# Patient Record
Sex: MALE | Race: WHITE | NOT HISPANIC OR LATINO | Employment: OTHER | URBAN - NONMETROPOLITAN AREA
[De-identification: names, ages, dates, MRNs, and addresses within clinical notes are randomized per-mention and may not be internally consistent; named-entity substitution may affect disease eponyms.]

---

## 2018-05-16 ENCOUNTER — TRANSCRIBE ORDERS (OUTPATIENT)
Dept: ADMINISTRATIVE | Facility: HOSPITAL | Age: 73
End: 2018-05-16

## 2018-05-16 DIAGNOSIS — R91.8 LUNG MASS: Primary | ICD-10-CM

## 2018-05-18 ENCOUNTER — HOSPITAL ENCOUNTER (OUTPATIENT)
Dept: PET IMAGING | Facility: HOSPITAL | Age: 73
Discharge: HOME | End: 2018-05-18
Admitting: FAMILY MEDICINE

## 2018-05-18 ENCOUNTER — APPOINTMENT (OUTPATIENT)
Dept: PET IMAGING | Facility: HOSPITAL | Age: 73
End: 2018-05-18

## 2018-05-18 DIAGNOSIS — R91.8 LUNG MASS: ICD-10-CM

## 2018-05-18 PROCEDURE — 0 FLUDEOXYGLUCOSE F18 SOLUTION: Performed by: FAMILY MEDICINE

## 2018-05-18 PROCEDURE — A9552 F18 FDG: HCPCS | Performed by: FAMILY MEDICINE

## 2018-05-18 PROCEDURE — 78815 PET IMAGE W/CT SKULL-THIGH: CPT | Performed by: RADIOLOGY

## 2018-05-18 PROCEDURE — 78815 PET IMAGE W/CT SKULL-THIGH: CPT

## 2018-05-18 RX ADMIN — FLUDEOXYGLUCOSE F18 1 DOSE: 300 INJECTION INTRAVENOUS at 08:10

## 2019-01-17 ENCOUNTER — HOSPITAL ENCOUNTER (EMERGENCY)
Facility: HOSPITAL | Age: 74
Discharge: HOME | End: 2019-01-17
Attending: EMERGENCY MEDICINE | Admitting: EMERGENCY MEDICINE

## 2019-01-17 ENCOUNTER — APPOINTMENT (OUTPATIENT)
Dept: CT IMAGING | Facility: HOSPITAL | Age: 74
End: 2019-01-17

## 2019-01-17 ENCOUNTER — APPOINTMENT (OUTPATIENT)
Dept: GENERAL RADIOLOGY | Facility: HOSPITAL | Age: 74
End: 2019-01-17

## 2019-01-17 ENCOUNTER — APPOINTMENT (OUTPATIENT)
Dept: CARDIOLOGY | Facility: HOSPITAL | Age: 74
End: 2019-01-17
Attending: EMERGENCY MEDICINE

## 2019-01-17 VITALS
HEIGHT: 78 IN | HEART RATE: 89 BPM | RESPIRATION RATE: 16 BRPM | WEIGHT: 240.3 LBS | BODY MASS INDEX: 27.8 KG/M2 | DIASTOLIC BLOOD PRESSURE: 88 MMHG | TEMPERATURE: 98.6 F | OXYGEN SATURATION: 95 % | SYSTOLIC BLOOD PRESSURE: 132 MMHG

## 2019-01-17 DIAGNOSIS — I82.623 ACUTE DEEP VEIN THROMBOSIS (DVT) OF BOTH UPPER EXTREMITIES, UNSPECIFIED VEIN (HCC): Primary | ICD-10-CM

## 2019-01-17 DIAGNOSIS — I48.92 ATRIAL FLUTTER WITH RAPID VENTRICULAR RESPONSE (HCC): ICD-10-CM

## 2019-01-17 DIAGNOSIS — Z79.01 CHRONIC ANTICOAGULATION: ICD-10-CM

## 2019-01-17 LAB
ALBUMIN SERPL-MCNC: 3.85 G/DL (ref 3.2–4.8)
ALBUMIN/GLOB SERPL: 1.4 G/DL (ref 1.5–2.5)
ALP SERPL-CCNC: 90 U/L (ref 25–100)
ALT SERPL W P-5'-P-CCNC: 45 U/L (ref 7–40)
ANION GAP SERPL CALCULATED.3IONS-SCNC: 8 MMOL/L (ref 3–11)
APTT PPP: 39.9 SECONDS (ref 24–37)
AST SERPL-CCNC: 25 U/L (ref 0–33)
BASOPHILS # BLD AUTO: 0.04 10*3/MM3 (ref 0–0.2)
BASOPHILS NFR BLD AUTO: 0.4 % (ref 0–1)
BH CV UPPER VENOUS LEFT AXILLARY COMPRESS: NORMAL
BH CV UPPER VENOUS LEFT AXILLARY PHASIC: NORMAL
BH CV UPPER VENOUS LEFT AXILLARY SPONT: NORMAL
BH CV UPPER VENOUS LEFT BASILIC FOREARM COLOR: 1
BH CV UPPER VENOUS LEFT BASILIC FOREARM COMPRESS: NORMAL
BH CV UPPER VENOUS LEFT BASILIC FOREARM THROMBUS: NORMAL
BH CV UPPER VENOUS LEFT BASILIC UPPER COMPRESS: NORMAL
BH CV UPPER VENOUS LEFT BRACHIAL COMPRESS: NORMAL
BH CV UPPER VENOUS LEFT BRACHIAL PHASIC: NORMAL
BH CV UPPER VENOUS LEFT BRACHIAL SPONT: NORMAL
BH CV UPPER VENOUS LEFT CEPHALIC FOREARM COMPRESS: NORMAL
BH CV UPPER VENOUS LEFT CEPHALIC UPPER COMPRESS: NORMAL
BH CV UPPER VENOUS LEFT INTERNAL JUGULAR COMPRESS: NORMAL
BH CV UPPER VENOUS LEFT INTERNAL JUGULAR PHASIC: NORMAL
BH CV UPPER VENOUS LEFT INTERNAL JUGULAR SPONT: NORMAL
BH CV UPPER VENOUS LEFT RADIAL COMPRESS: NORMAL
BH CV UPPER VENOUS LEFT RADIAL PHASIC: NORMAL
BH CV UPPER VENOUS LEFT RADIAL SPONT: NORMAL
BH CV UPPER VENOUS LEFT SUBCLAVIAN COMPRESS: NORMAL
BH CV UPPER VENOUS LEFT SUBCLAVIAN PHASIC: NORMAL
BH CV UPPER VENOUS LEFT SUBCLAVIAN SPONT: NORMAL
BH CV UPPER VENOUS LEFT ULNAR COMPRESS: NORMAL
BH CV UPPER VENOUS LEFT ULNAR PHASIC: NORMAL
BH CV UPPER VENOUS LEFT ULNAR SPONT: NORMAL
BH CV UPPER VENOUS RIGHT AXILLARY COMPRESS: NORMAL
BH CV UPPER VENOUS RIGHT AXILLARY PHASIC: NORMAL
BH CV UPPER VENOUS RIGHT AXILLARY SPONT: NORMAL
BH CV UPPER VENOUS RIGHT BASILIC FOREARM COLOR: 1
BH CV UPPER VENOUS RIGHT BASILIC FOREARM COMPRESS: NORMAL
BH CV UPPER VENOUS RIGHT BASILIC FOREARM THROMBUS: NORMAL
BH CV UPPER VENOUS RIGHT BASILIC UPPER COLOR: 1
BH CV UPPER VENOUS RIGHT BASILIC UPPER COMPRESS: NORMAL
BH CV UPPER VENOUS RIGHT BASILIC UPPER THROMBUS: NORMAL
BH CV UPPER VENOUS RIGHT BRACHIAL COMPRESS: NORMAL
BH CV UPPER VENOUS RIGHT BRACHIAL PHASIC: NORMAL
BH CV UPPER VENOUS RIGHT BRACHIAL SPONT: NORMAL
BH CV UPPER VENOUS RIGHT CEPHALIC FOREARM COLOR: 1
BH CV UPPER VENOUS RIGHT CEPHALIC FOREARM COMPRESS: NORMAL
BH CV UPPER VENOUS RIGHT CEPHALIC FOREARM THROMBUS: NORMAL
BH CV UPPER VENOUS RIGHT CEPHALIC UPPER COMPRESS: NORMAL
BH CV UPPER VENOUS RIGHT INTERNAL JUGULAR COMPRESS: NORMAL
BH CV UPPER VENOUS RIGHT INTERNAL JUGULAR PHASIC: NORMAL
BH CV UPPER VENOUS RIGHT INTERNAL JUGULAR SPONT: NORMAL
BH CV UPPER VENOUS RIGHT RADIAL COMPRESS: NORMAL
BH CV UPPER VENOUS RIGHT RADIAL PHASIC: NORMAL
BH CV UPPER VENOUS RIGHT RADIAL SPONT: NORMAL
BH CV UPPER VENOUS RIGHT SUBCLAVIAN COMPRESS: NORMAL
BH CV UPPER VENOUS RIGHT SUBCLAVIAN PHASIC: NORMAL
BH CV UPPER VENOUS RIGHT SUBCLAVIAN SPONT: NORMAL
BH CV UPPER VENOUS RIGHT ULNAR COMPRESS: NORMAL
BH CV UPPER VENOUS RIGHT ULNAR PHASIC: NORMAL
BH CV UPPER VENOUS RIGHT ULNAR SPONT: NORMAL
BILIRUB SERPL-MCNC: 0.3 MG/DL (ref 0.3–1.2)
BNP SERPL-MCNC: 247 PG/ML (ref 0–100)
BUN BLD-MCNC: 10 MG/DL (ref 9–23)
BUN/CREAT SERPL: 12.3 (ref 7–25)
CALCIUM SPEC-SCNC: 9 MG/DL (ref 8.7–10.4)
CHLORIDE SERPL-SCNC: 99 MMOL/L (ref 99–109)
CO2 SERPL-SCNC: 29 MMOL/L (ref 20–31)
CREAT BLD-MCNC: 0.81 MG/DL (ref 0.6–1.3)
DEPRECATED RDW RBC AUTO: 50.8 FL (ref 37–54)
EOSINOPHIL # BLD AUTO: 0.23 10*3/MM3 (ref 0–0.3)
EOSINOPHIL NFR BLD AUTO: 2.4 % (ref 0–3)
ERYTHROCYTE [DISTWIDTH] IN BLOOD BY AUTOMATED COUNT: 15.8 % (ref 11.3–14.5)
GFR SERPL CREATININE-BSD FRML MDRD: 94 ML/MIN/1.73
GLOBULIN UR ELPH-MCNC: 2.9 GM/DL
GLUCOSE BLD-MCNC: 231 MG/DL (ref 70–100)
HCT VFR BLD AUTO: 35.9 % (ref 38.9–50.9)
HGB BLD-MCNC: 11.5 G/DL (ref 13.1–17.5)
HOLD SPECIMEN: NORMAL
HOLD SPECIMEN: NORMAL
IMM GRANULOCYTES # BLD AUTO: 0.02 10*3/MM3 (ref 0–0.03)
IMM GRANULOCYTES NFR BLD AUTO: 0.2 % (ref 0–0.6)
INR PPP: 1.26 (ref 0.85–1.16)
LIPASE SERPL-CCNC: 24 U/L (ref 6–51)
LYMPHOCYTES # BLD AUTO: 2.1 10*3/MM3 (ref 0.6–4.8)
LYMPHOCYTES NFR BLD AUTO: 21.8 % (ref 24–44)
MAGNESIUM SERPL-MCNC: 2 MG/DL (ref 1.3–2.7)
MCH RBC QN AUTO: 28.3 PG (ref 27–31)
MCHC RBC AUTO-ENTMCNC: 32 G/DL (ref 32–36)
MCV RBC AUTO: 88.4 FL (ref 80–99)
MONOCYTES # BLD AUTO: 1.09 10*3/MM3 (ref 0–1)
MONOCYTES NFR BLD AUTO: 11.3 % (ref 0–12)
NEUTROPHILS # BLD AUTO: 6.17 10*3/MM3 (ref 1.5–8.3)
NEUTROPHILS NFR BLD AUTO: 63.9 % (ref 41–71)
PLATELET # BLD AUTO: 317 10*3/MM3 (ref 150–450)
PMV BLD AUTO: 9 FL (ref 6–12)
POTASSIUM BLD-SCNC: 4.3 MMOL/L (ref 3.5–5.5)
PROT SERPL-MCNC: 6.7 G/DL (ref 5.7–8.2)
PROTHROMBIN TIME: 15.2 SECONDS (ref 11.2–14.3)
RBC # BLD AUTO: 4.06 10*6/MM3 (ref 4.2–5.76)
SODIUM BLD-SCNC: 136 MMOL/L (ref 132–146)
T4 FREE SERPL-MCNC: 1.36 NG/DL (ref 0.89–1.76)
TROPONIN I SERPL-MCNC: 0 NG/ML (ref 0–0.07)
TROPONIN I SERPL-MCNC: 0 NG/ML (ref 0–0.07)
TSH SERPL DL<=0.05 MIU/L-ACNC: 2.84 MIU/ML (ref 0.35–5.35)
WBC NRBC COR # BLD: 9.65 10*3/MM3 (ref 3.5–10.8)
WHOLE BLOOD HOLD SPECIMEN: NORMAL
WHOLE BLOOD HOLD SPECIMEN: NORMAL

## 2019-01-17 PROCEDURE — 96374 THER/PROPH/DIAG INJ IV PUSH: CPT

## 2019-01-17 PROCEDURE — 71275 CT ANGIOGRAPHY CHEST: CPT

## 2019-01-17 PROCEDURE — 71045 X-RAY EXAM CHEST 1 VIEW: CPT

## 2019-01-17 PROCEDURE — 83690 ASSAY OF LIPASE: CPT | Performed by: EMERGENCY MEDICINE

## 2019-01-17 PROCEDURE — 84443 ASSAY THYROID STIM HORMONE: CPT | Performed by: EMERGENCY MEDICINE

## 2019-01-17 PROCEDURE — 85610 PROTHROMBIN TIME: CPT | Performed by: EMERGENCY MEDICINE

## 2019-01-17 PROCEDURE — 83735 ASSAY OF MAGNESIUM: CPT | Performed by: EMERGENCY MEDICINE

## 2019-01-17 PROCEDURE — 83880 ASSAY OF NATRIURETIC PEPTIDE: CPT | Performed by: EMERGENCY MEDICINE

## 2019-01-17 PROCEDURE — 93970 EXTREMITY STUDY: CPT

## 2019-01-17 PROCEDURE — 93970 EXTREMITY STUDY: CPT | Performed by: INTERNAL MEDICINE

## 2019-01-17 PROCEDURE — 85730 THROMBOPLASTIN TIME PARTIAL: CPT | Performed by: EMERGENCY MEDICINE

## 2019-01-17 PROCEDURE — 80053 COMPREHEN METABOLIC PANEL: CPT | Performed by: EMERGENCY MEDICINE

## 2019-01-17 PROCEDURE — 93005 ELECTROCARDIOGRAM TRACING: CPT

## 2019-01-17 PROCEDURE — 93005 ELECTROCARDIOGRAM TRACING: CPT | Performed by: EMERGENCY MEDICINE

## 2019-01-17 PROCEDURE — 85025 COMPLETE CBC W/AUTO DIFF WBC: CPT | Performed by: EMERGENCY MEDICINE

## 2019-01-17 PROCEDURE — 84439 ASSAY OF FREE THYROXINE: CPT | Performed by: EMERGENCY MEDICINE

## 2019-01-17 PROCEDURE — 84484 ASSAY OF TROPONIN QUANT: CPT

## 2019-01-17 PROCEDURE — 96376 TX/PRO/DX INJ SAME DRUG ADON: CPT

## 2019-01-17 PROCEDURE — 0 IOPAMIDOL PER 1 ML: Performed by: EMERGENCY MEDICINE

## 2019-01-17 PROCEDURE — 99285 EMERGENCY DEPT VISIT HI MDM: CPT

## 2019-01-17 RX ORDER — ASPIRIN 81 MG/1
81 TABLET ORAL DAILY
COMMUNITY

## 2019-01-17 RX ORDER — PENTOXIFYLLINE 400 MG/1
400 TABLET, EXTENDED RELEASE ORAL 2 TIMES DAILY
COMMUNITY

## 2019-01-17 RX ORDER — PANTOPRAZOLE SODIUM 40 MG/1
40 TABLET, DELAYED RELEASE ORAL DAILY
COMMUNITY

## 2019-01-17 RX ORDER — ASPIRIN 81 MG/1
324 TABLET, CHEWABLE ORAL ONCE
Status: DISCONTINUED | OUTPATIENT
Start: 2019-01-17 | End: 2019-01-17

## 2019-01-17 RX ORDER — AMIODARONE HYDROCHLORIDE 200 MG/1
200 TABLET ORAL DAILY
COMMUNITY
End: 2019-01-26 | Stop reason: HOSPADM

## 2019-01-17 RX ORDER — METOPROLOL TARTRATE 5 MG/5ML
5 INJECTION INTRAVENOUS ONCE
Status: COMPLETED | OUTPATIENT
Start: 2019-01-17 | End: 2019-01-17

## 2019-01-17 RX ORDER — SODIUM CHLORIDE 0.9 % (FLUSH) 0.9 %
10 SYRINGE (ML) INJECTION AS NEEDED
Status: DISCONTINUED | OUTPATIENT
Start: 2019-01-17 | End: 2019-01-17 | Stop reason: HOSPADM

## 2019-01-17 RX ORDER — METOPROLOL SUCCINATE 50 MG/1
50 TABLET, EXTENDED RELEASE ORAL 2 TIMES DAILY
COMMUNITY
End: 2019-02-04 | Stop reason: DRUGHIGH

## 2019-01-17 RX ORDER — METOPROLOL TARTRATE 5 MG/5ML
5 INJECTION INTRAVENOUS ONCE
Status: DISCONTINUED | OUTPATIENT
Start: 2019-01-17 | End: 2019-01-17 | Stop reason: HOSPADM

## 2019-01-17 RX ADMIN — METOPROLOL TARTRATE 25 MG: 25 TABLET ORAL at 15:33

## 2019-01-17 RX ADMIN — METOPROLOL TARTRATE 5 MG: 1 INJECTION, SOLUTION INTRAVENOUS at 15:33

## 2019-01-17 RX ADMIN — METOPROLOL TARTRATE 25 MG: 25 TABLET ORAL at 18:01

## 2019-01-17 RX ADMIN — METOPROLOL TARTRATE 5 MG: 1 INJECTION, SOLUTION INTRAVENOUS at 17:58

## 2019-01-17 RX ADMIN — IOPAMIDOL 75 ML: 755 INJECTION, SOLUTION INTRAVENOUS at 17:24

## 2019-01-17 NOTE — DISCHARGE INSTRUCTIONS
Schedule an appointment with your PCP, Dr. Arriaga, within one week for close follow up of your DVT until resolution. Follow up with the atrial fibrillation clinic I have referred you to within one week if you chose to do so. Follow up with Dr. Tracey in coordination with your PCP for follow up and evaluation of your blood clots. Follow up with your Cardiologist tomorrow as scheduled. Increase your Eliquis to 10 mgs ( 2 pills) twice a day for the next 7 days. Afterward, take 5 mgs (1 pill) twice a day for both treatment of the DVT which will be at least 3 months, and for stroke prophylaxis with your atrial flutter.  The total duration will be determined by your primary care physician and cardiologist. Increase your Metoprolol XL to 100 mg nightly.     Immediately return to the ED for worsening or new concerning symptoms.     Please review the medications you are supposed to be taking according to prior physician recommendations. I have not changed your home medications during this visit. If your discharge instructions indicate that I have changed your home medications, this is not the case, and you should disregard. If you have any questions about the medication you should be taking at home, please call your physician.

## 2019-01-17 NOTE — ED PROVIDER NOTES
Subjective   Monico Roman is a 73 y.o.male who presents to the ED with complaints of a rapid heart rate. The patient reports having rapid palpitations for the past 2 weeks. He has been seen at [facility] for his heart rate. He says they changed his medication, put him on Eliquis, and advised to schedule a follow up appointment with his Cardiologist. He denies being cardioverted electrically or discussing plans for it. He has had a cough for 1 week. The patient has a hx of lung cancer and states that he had a partial lobectomy 2-3 months ago which was successful and without complications. He was receiving chemotherapy and radiation prior to his procedure. He quit smoking prior to this procedure. During his most recent hospital visit, he was diagnosed with CHF as well. His Cardiologist is in [place] and has an appointment scheduled for tomorrow, but he and his family express moving his Cardiology care to [facility]. He has no prior hx of palpitations, atrial flutter, or atrial fibrillation. There are no other acute complaints at this time.         History provided by:  Patient  Palpitations   Palpitations quality:  Fast  Onset quality:  Gradual  Duration:  2 weeks  Timing:  Constant  Progression:  Unchanged  Chronicity:  New  Relieved by:  Nothing  Worsened by:  Nothing      Review of Systems   Cardiovascular: Positive for palpitations.   All other systems reviewed and are negative.      Past Medical History:   Diagnosis Date   • Atrial fibrillation (CMS/HCC)    • Borderline diabetes    • Cancer (CMS/HCC)     small cell carcinoma   • CHF (congestive heart failure) (CMS/HCC)    • Coronary artery disease    • Hypertension    • Myocardial infarction (CMS/HCC)    • Stented coronary artery        No Known Allergies    Past Surgical History:   Procedure Laterality Date   • LUNG REMOVAL, PARTIAL         History reviewed. No pertinent family history.    Social History     Socioeconomic History   •  Marital status:      Spouse name: Not on file   • Number of children: Not on file   • Years of education: Not on file   • Highest education level: Not on file   Tobacco Use   • Smoking status: Former Smoker   Substance and Sexual Activity   • Alcohol use: No     Frequency: Never   • Drug use: No   • Sexual activity: Defer         Objective   Physical Exam   Constitutional: He is oriented to person, place, and time. He appears well-developed and well-nourished. No distress.   HENT:   Head: Normocephalic and atraumatic.   Nose: Nose normal.   Eyes: Conjunctivae are normal. No scleral icterus.   Neck: Normal range of motion. Neck supple.   Cardiovascular: Normal heart sounds. An irregularly irregular rhythm present. Tachycardia present.   No murmur heard.  Pulmonary/Chest: Effort normal. No respiratory distress.   He has decreased breath sounds on the left.    Abdominal: Soft. Bowel sounds are normal. There is no tenderness.   Musculoskeletal: Normal range of motion. He exhibits edema.   He has trace ankle edema.   Neurological: He is alert and oriented to person, place, and time.   Skin: Skin is warm and dry. There is erythema.   He has some firm areas of erythema with rope like density proximal to his IV sight near his right antecubital fossa.    Psychiatric: He has a normal mood and affect. His behavior is normal.   Nursing note and vitals reviewed.      Procedures         ED Course  ED Course as of Jan 17 1822   Thu Jan 17, 2019 1735 Reevaluated the patient at bedside and updated him on findings and plan for further care. His wife who is now at  bedside said that Sanders did shock him at his most recent visit.   [TJ]   1759 Revisited the patient at bedside and updated him on findings and plan for further care.   [TJ]   1811 Patient had some recurrent tachycardia treated with 3 total doses of IV metoprolol along with 50 mg of the medial release oral metoprolol.  Tachycardia is now again improved.  He is  on 50 mg of Toprol-XL at night.  I discussed increasing this to 100 mg tonight.  He continues on amiodarone and has a pre-existing appointment with his cardiologist in [place] tomorrow.  His daughter requested follow-up here at [facility] with cardiology, however in discussion with the patient and his wife he is reticent to drive 3 hours round trip for cardiology care here and would prefer to follow up tomorrow with his cardiologist in [place].  They however would like referral to the A. fib clinic in coordination with this in case they need specialty care for his atrial flutter here in [place] but again have declined A. fib clinic follow-up tomorrow as they are to have a prescheduled appointment with cardiology.I discussed his bilateral upper extremity DVTs but no PE.  He is already on Eliquis.  I'll increase his dose consistent with acute DVT treatment.I discussed follow-up with his PCP concerning his upper extremity DVTs, but will refer him to Dr. Tracey as well for outpatient hematology management in coordination with his physicianWe've discussed indications for immediate returnVery pleasant reliable patient and spouse verbalized understanding agreement with plan of care, need for follow-up, and indications for immediate return.  [HH]   1819 He increased his dose of Eliquis to 20 mg twice a day for 7 days consistent with acute treatment of DVT, with continuation of 5 mg twice a day ID for treatment of DVT as well as stroke prophylaxis.  [HH]      ED Course User Index  [HH] Jeremías Pack MD  [TJ] Jc Reyes       Recent Results (from the past 24 hour(s))   Comprehensive Metabolic Panel    Collection Time: 01/17/19  2:32 PM   Result Value Ref Range    Glucose 231 (H) 70 - 100 mg/dL    BUN 10 9 - 23 mg/dL    Creatinine 0.81 0.60 - 1.30 mg/dL    Sodium 136 132 - 146 mmol/L    Potassium 4.3 3.5 - 5.5 mmol/L    Chloride 99 99 - 109 mmol/L    CO2 29.0 20.0 - 31.0 mmol/L    Calcium 9.0 8.7 - 10.4 mg/dL     Total Protein 6.7 5.7 - 8.2 g/dL    Albumin 3.85 3.20 - 4.80 g/dL    ALT (SGPT) 45 (H) 7 - 40 U/L    AST (SGOT) 25 0 - 33 U/L    Alkaline Phosphatase 90 25 - 100 U/L    Total Bilirubin 0.3 0.3 - 1.2 mg/dL    eGFR Non African Amer 94 >60 mL/min/1.73    Globulin 2.9 gm/dL    A/G Ratio 1.4 (L) 1.5 - 2.5 g/dL    BUN/Creatinine Ratio 12.3 7.0 - 25.0    Anion Gap 8.0 3.0 - 11.0 mmol/L   Lipase    Collection Time: 01/17/19  2:32 PM   Result Value Ref Range    Lipase 24 6 - 51 U/L   BNP    Collection Time: 01/17/19  2:32 PM   Result Value Ref Range    .0 (H) 0.0 - 100.0 pg/mL   Light Blue Top    Collection Time: 01/17/19  2:32 PM   Result Value Ref Range    Extra Tube hold for add-on    Green Top (Gel)    Collection Time: 01/17/19  2:32 PM   Result Value Ref Range    Extra Tube Hold for add-ons.    Lavender Top    Collection Time: 01/17/19  2:32 PM   Result Value Ref Range    Extra Tube hold for add-on    Gold Top - SST    Collection Time: 01/17/19  2:32 PM   Result Value Ref Range    Extra Tube Hold for add-ons.    CBC Auto Differential    Collection Time: 01/17/19  2:32 PM   Result Value Ref Range    WBC 9.65 3.50 - 10.80 10*3/mm3    RBC 4.06 (L) 4.20 - 5.76 10*6/mm3    Hemoglobin 11.5 (L) 13.1 - 17.5 g/dL    Hematocrit 35.9 (L) 38.9 - 50.9 %    MCV 88.4 80.0 - 99.0 fL    MCH 28.3 27.0 - 31.0 pg    MCHC 32.0 32.0 - 36.0 g/dL    RDW 15.8 (H) 11.3 - 14.5 %    RDW-SD 50.8 37.0 - 54.0 fl    MPV 9.0 6.0 - 12.0 fL    Platelets 317 150 - 450 10*3/mm3    Neutrophil % 63.9 41.0 - 71.0 %    Lymphocyte % 21.8 (L) 24.0 - 44.0 %    Monocyte % 11.3 0.0 - 12.0 %    Eosinophil % 2.4 0.0 - 3.0 %    Basophil % 0.4 0.0 - 1.0 %    Immature Grans % 0.2 0.0 - 0.6 %    Neutrophils, Absolute 6.17 1.50 - 8.30 10*3/mm3    Lymphocytes, Absolute 2.10 0.60 - 4.80 10*3/mm3    Monocytes, Absolute 1.09 (H) 0.00 - 1.00 10*3/mm3    Eosinophils, Absolute 0.23 0.00 - 0.30 10*3/mm3    Basophils, Absolute 0.04 0.00 - 0.20 10*3/mm3    Immature Grans,  Absolute 0.02 0.00 - 0.03 10*3/mm3   Protime-INR    Collection Time: 01/17/19  2:32 PM   Result Value Ref Range    Protime 15.2 (H) 11.2 - 14.3 Seconds    INR 1.26 (H) 0.85 - 1.16   aPTT    Collection Time: 01/17/19  2:32 PM   Result Value Ref Range    PTT 39.9 (H) 24.0 - 37.0 seconds   Magnesium    Collection Time: 01/17/19  2:32 PM   Result Value Ref Range    Magnesium 2.0 1.3 - 2.7 mg/dL   TSH    Collection Time: 01/17/19  2:32 PM   Result Value Ref Range    TSH 2.842 0.350 - 5.350 mIU/mL   T4, Free    Collection Time: 01/17/19  2:32 PM   Result Value Ref Range    Free T4 1.36 0.89 - 1.76 ng/dL   POC Troponin, Rapid    Collection Time: 01/17/19  2:39 PM   Result Value Ref Range    Troponin I 0.00 0.00 - 0.07 ng/mL   Duplex Venous Upper Extremity - Bilateral CAR    Collection Time: 01/17/19  4:42 PM   Result Value Ref Range    Right Internal Jugular Compress C     Right Internal Jugular Phasic Y     Right Internal Jugular Spont Y     Left Internal Jugular Compress C     Left Internal Jugular Phasic Y     Left Internal Jugular Spont Y     Right Subclavian Compress C     Right Subclavian Phasic Y     Right Subclavian Spont Y     Left Subclavian Compress C     Left Subclavian Phasic Y     Left Subclavian Spont Y     Right Axillary Compress C     Right Axillary Phasic Y     Right Axillary Spont Y     Left Axillary Compress C     Left Axillary Phasic Y     Left Axillary Spont Y     Right Brachial Compress C     Right Brachial Phasic Y     Right Brachial Spont Y     Left Brachial Compress C     Left Brachial Phasic Y     Left Brachial Spont Y     Right Radial Compress C     Right Radial Phasic Y     Right Radial Spont Y     Left Radial Compress C     Left Radial Phasic Y     Left Radial Spont Y     Right Ulnar Compress C     Right Ulnar Phasic Y     Right Ulnar Spont Y     Left Ulnar Compress C     Left Ulnar Phasic Y     Left Ulnar Spont Y     Right Basilic Upper Compress P     Right Basilic Upper Thrombus A     Right  "Basilic Upper Color 1     Left Basilic Upper Compress C     Right Basilic Forearm Compress P     Right Basilic Forearm Thrombus A     Right Basilic Forearm Color 1     Left Basilic Forearm Compress N     Left Basilic Forearm Thrombus A     Left Basilic Forearm Color 1     Right Cephalic Upper Compress C     Left Cephalic Upper Compress C     Right Cephalic Forearm Compress P     Right Cephalic Forearm Thrombus A     Right Cephalic Forearm Color 1     Left Cephalic Forearm Compress C    POC Troponin, Rapid    Collection Time: 01/17/19  4:52 PM   Result Value Ref Range    Troponin I 0.00 0.00 - 0.07 ng/mL     Note: In addition to lab results from this visit, the labs listed above may include labs taken at another facility or during a different encounter within the last 24 hours. Please correlate lab times with ED admission and discharge times for further clarification of the services performed during this visit.    CT Angiogram Chest With Contrast   Final Result   1. There is no evidence of pulmonary embolus.   2. There are postoperative and postinflammatory pulmonary changes with   no acute inflammatory process or mass.   3. There is a small pericardial effusion, and there are trace bilateral   pleural effusions.       DICTATED:   1/17/2019   EDITED/ls :   1/17/2019        This report was finalized on 1/17/2019 5:58 PM by Dr. Omari Strong MD.          XR Chest 1 View   Final Result   No acute finding.       D:  01/17/2019   E:  01/17/2019       This report was finalized on 1/17/2019 4:08 PM by Larry Gaspar.            Vitals:    01/17/19 1533 01/17/19 1546 01/17/19 1642 01/17/19 1730   BP:    152/91   Pulse: 110 91  (!) 123   Resp:       Temp:       SpO2:  96%  97%   Weight:   109 kg (240 lb 4.8 oz)    Height:   200.7 cm (79.02\")      Medications   sodium chloride 0.9 % flush 10 mL (not administered)   metoprolol tartrate (LOPRESSOR) injection 5 mg (not administered)   metoprolol tartrate (LOPRESSOR) injection 5 mg (5 " mg Intravenous Given 1/17/19 1533)   metoprolol tartrate (LOPRESSOR) tablet 25 mg (25 mg Oral Given 1/17/19 1533)   metoprolol tartrate (LOPRESSOR) injection 5 mg (5 mg Intravenous Given 1/17/19 1758)   metoprolol tartrate (LOPRESSOR) tablet 25 mg (25 mg Oral Given 1/17/19 1801)   iopamidol (ISOVUE-370) 76 % injection 100 mL (75 mL Intravenous Given 1/17/19 1724)     ECG/EMG Results (last 24 hours)     Procedure Component Value Units Date/Time    ECG 12 Lead [[id number]] Collected:  01/17/19 1429     Updated:  01/17/19 1429                      MDM    Final diagnoses:   Acute deep vein thrombosis (DVT) of both upper extremities, unspecified vein (CMS/HCC)   Atrial flutter with rapid ventricular response (CMS/HCC)   Chronic anticoagulation       Documentation assistance provided by kathleen Reyes.  Information recorded by the darlingibrhoda was done at my direction and has been verified and validated by me.     Jc Reyes  01/17/19 1500       Jeremías Pack MD  01/17/19 8001

## 2019-01-22 ENCOUNTER — TELEPHONE (OUTPATIENT)
Dept: CARDIOLOGY | Facility: HOSPITAL | Age: 74
End: 2019-01-22

## 2019-01-22 ENCOUNTER — OFFICE VISIT (OUTPATIENT)
Dept: CARDIOLOGY | Facility: HOSPITAL | Age: 74
End: 2019-01-22

## 2019-01-22 ENCOUNTER — HOSPITAL ENCOUNTER (INPATIENT)
Facility: HOSPITAL | Age: 74
LOS: 4 days | Discharge: HOME | End: 2019-01-26
Attending: FAMILY MEDICINE | Admitting: INTERNAL MEDICINE

## 2019-01-22 ENCOUNTER — HOSPITAL ENCOUNTER (OUTPATIENT)
Dept: CARDIOLOGY | Facility: HOSPITAL | Age: 74
Discharge: HOME | End: 2019-01-22

## 2019-01-22 VITALS
TEMPERATURE: 97.4 F | SYSTOLIC BLOOD PRESSURE: 136 MMHG | RESPIRATION RATE: 18 BRPM | BODY MASS INDEX: 27.07 KG/M2 | DIASTOLIC BLOOD PRESSURE: 67 MMHG | OXYGEN SATURATION: 92 % | HEART RATE: 130 BPM | HEIGHT: 78 IN

## 2019-01-22 DIAGNOSIS — I48.92 ATRIAL FLUTTER, UNSPECIFIED TYPE (HCC): Primary | ICD-10-CM

## 2019-01-22 DIAGNOSIS — I48.92 ATRIAL FLUTTER, UNSPECIFIED TYPE (HCC): ICD-10-CM

## 2019-01-22 DIAGNOSIS — R06.02 SHORTNESS OF BREATH: ICD-10-CM

## 2019-01-22 DIAGNOSIS — J44.9 CHRONIC OBSTRUCTIVE PULMONARY DISEASE, UNSPECIFIED COPD TYPE (HCC): ICD-10-CM

## 2019-01-22 DIAGNOSIS — I25.10 CORONARY ARTERY DISEASE INVOLVING NATIVE CORONARY ARTERY OF NATIVE HEART WITHOUT ANGINA PECTORIS: ICD-10-CM

## 2019-01-22 DIAGNOSIS — I50.9 CONGESTIVE HEART FAILURE, UNSPECIFIED HF CHRONICITY, UNSPECIFIED HEART FAILURE TYPE (HCC): Primary | ICD-10-CM

## 2019-01-22 DIAGNOSIS — E87.70 HYPERVOLEMIA, UNSPECIFIED HYPERVOLEMIA TYPE: ICD-10-CM

## 2019-01-22 PROBLEM — M19.90 ARTHRITIS: Chronic | Status: ACTIVE | Noted: 2019-01-22

## 2019-01-22 PROBLEM — I82.629 ACUTE DEEP VEIN THROMBOSIS (DVT) OF UPPER EXTREMITY: Status: ACTIVE | Noted: 2019-01-22

## 2019-01-22 PROBLEM — I10 HYPERTENSION: Status: ACTIVE | Noted: 2019-01-22

## 2019-01-22 PROBLEM — C61 PROSTATE CANCER: Chronic | Status: ACTIVE | Noted: 2019-01-22

## 2019-01-22 PROBLEM — I48.91 ATRIAL FIBRILLATION (HCC): Chronic | Status: ACTIVE | Noted: 2019-01-22

## 2019-01-22 PROBLEM — C80.1 CANCER (HCC): Status: ACTIVE | Noted: 2019-01-22

## 2019-01-22 PROBLEM — R73.03 BORDERLINE DIABETES: Status: ACTIVE | Noted: 2019-01-22

## 2019-01-22 PROBLEM — I21.9 MYOCARDIAL INFARCTION (HCC): Status: ACTIVE | Noted: 2019-01-22

## 2019-01-22 PROBLEM — R60.0 BILATERAL LOWER EXTREMITY EDEMA: Status: ACTIVE | Noted: 2019-01-22

## 2019-01-22 PROBLEM — Z95.5 STENTED CORONARY ARTERY: Status: ACTIVE | Noted: 2019-01-22

## 2019-01-22 PROBLEM — K21.9 GERD (GASTROESOPHAGEAL REFLUX DISEASE): Chronic | Status: ACTIVE | Noted: 2019-01-22

## 2019-01-22 LAB
ALBUMIN SERPL-MCNC: 3.85 G/DL (ref 3.2–4.8)
ALBUMIN/GLOB SERPL: 1.3 G/DL (ref 1.5–2.5)
ALP SERPL-CCNC: 115 U/L (ref 25–100)
ALT SERPL W P-5'-P-CCNC: 43 U/L (ref 7–40)
ANION GAP SERPL CALCULATED.3IONS-SCNC: 11 MMOL/L (ref 3–11)
ANION GAP SERPL CALCULATED.3IONS-SCNC: 11 MMOL/L (ref 3–11)
AST SERPL-CCNC: 31 U/L (ref 0–33)
BASOPHILS # BLD AUTO: 0.01 10*3/MM3 (ref 0–0.2)
BASOPHILS NFR BLD AUTO: 0.1 % (ref 0–1)
BILIRUB SERPL-MCNC: 0.6 MG/DL (ref 0.3–1.2)
BNP SERPL-MCNC: 168 PG/ML (ref 0–100)
BUN BLD-MCNC: 16 MG/DL (ref 9–23)
BUN BLD-MCNC: 16 MG/DL (ref 9–23)
BUN/CREAT SERPL: 16.8 (ref 7–25)
BUN/CREAT SERPL: 19.8 (ref 7–25)
CALCIUM SPEC-SCNC: 8.3 MG/DL (ref 8.7–10.4)
CALCIUM SPEC-SCNC: 9.1 MG/DL (ref 8.7–10.4)
CHLORIDE SERPL-SCNC: 98 MMOL/L (ref 99–109)
CHLORIDE SERPL-SCNC: 99 MMOL/L (ref 99–109)
CO2 SERPL-SCNC: 25 MMOL/L (ref 20–31)
CO2 SERPL-SCNC: 28 MMOL/L (ref 20–31)
CREAT BLD-MCNC: 0.81 MG/DL (ref 0.6–1.3)
CREAT BLD-MCNC: 0.95 MG/DL (ref 0.6–1.3)
DEPRECATED RDW RBC AUTO: 51.6 FL (ref 37–54)
EOSINOPHIL # BLD AUTO: 0 10*3/MM3 (ref 0–0.3)
EOSINOPHIL NFR BLD AUTO: 0 % (ref 0–3)
ERYTHROCYTE [DISTWIDTH] IN BLOOD BY AUTOMATED COUNT: 16.1 % (ref 11.3–14.5)
GFR SERPL CREATININE-BSD FRML MDRD: 78 ML/MIN/1.73
GFR SERPL CREATININE-BSD FRML MDRD: 94 ML/MIN/1.73
GLOBULIN UR ELPH-MCNC: 3.1 GM/DL
GLUCOSE BLD-MCNC: 184 MG/DL (ref 70–100)
GLUCOSE BLD-MCNC: 242 MG/DL (ref 70–100)
GLUCOSE BLDC GLUCOMTR-MCNC: 207 MG/DL (ref 70–130)
HCT VFR BLD AUTO: 35.2 % (ref 38.9–50.9)
HGB BLD-MCNC: 10.9 G/DL (ref 13.1–17.5)
IMM GRANULOCYTES # BLD AUTO: 0.02 10*3/MM3 (ref 0–0.03)
IMM GRANULOCYTES NFR BLD AUTO: 0.2 % (ref 0–0.6)
LYMPHOCYTES # BLD AUTO: 1.91 10*3/MM3 (ref 0.6–4.8)
LYMPHOCYTES NFR BLD AUTO: 16.4 % (ref 24–44)
MCH RBC QN AUTO: 26.9 PG (ref 27–31)
MCHC RBC AUTO-ENTMCNC: 31 G/DL (ref 32–36)
MCV RBC AUTO: 86.9 FL (ref 80–99)
MONOCYTES # BLD AUTO: 1.22 10*3/MM3 (ref 0–1)
MONOCYTES NFR BLD AUTO: 10.5 % (ref 0–12)
NEUTROPHILS # BLD AUTO: 8.47 10*3/MM3 (ref 1.5–8.3)
NEUTROPHILS NFR BLD AUTO: 72.8 % (ref 41–71)
PLATELET # BLD AUTO: 337 10*3/MM3 (ref 150–450)
PMV BLD AUTO: 8.9 FL (ref 6–12)
POTASSIUM BLD-SCNC: 4.2 MMOL/L (ref 3.5–5.5)
POTASSIUM BLD-SCNC: 5 MMOL/L (ref 3.5–5.5)
PROT SERPL-MCNC: 6.9 G/DL (ref 5.7–8.2)
RBC # BLD AUTO: 4.05 10*6/MM3 (ref 4.2–5.76)
SODIUM BLD-SCNC: 134 MMOL/L (ref 132–146)
SODIUM BLD-SCNC: 138 MMOL/L (ref 132–146)
TROPONIN I SERPL-MCNC: 0.01 NG/ML
TSH SERPL DL<=0.05 MIU/L-ACNC: 2.28 MIU/ML (ref 0.35–5.35)
WBC NRBC COR # BLD: 11.63 10*3/MM3 (ref 3.5–10.8)

## 2019-01-22 PROCEDURE — 93005 ELECTROCARDIOGRAM TRACING: CPT | Performed by: NURSE PRACTITIONER

## 2019-01-22 PROCEDURE — 93010 ELECTROCARDIOGRAM REPORT: CPT | Performed by: INTERNAL MEDICINE

## 2019-01-22 PROCEDURE — 84484 ASSAY OF TROPONIN QUANT: CPT | Performed by: NURSE PRACTITIONER

## 2019-01-22 PROCEDURE — 99223 1ST HOSP IP/OBS HIGH 75: CPT | Performed by: FAMILY MEDICINE

## 2019-01-22 PROCEDURE — 63710000001 INSULIN LISPRO (HUMAN) PER 5 UNITS: Performed by: NURSE PRACTITIONER

## 2019-01-22 PROCEDURE — 80053 COMPREHEN METABOLIC PANEL: CPT | Performed by: NURSE PRACTITIONER

## 2019-01-22 PROCEDURE — 83880 ASSAY OF NATRIURETIC PEPTIDE: CPT | Performed by: NURSE PRACTITIONER

## 2019-01-22 PROCEDURE — 99204 OFFICE O/P NEW MOD 45 MIN: CPT | Performed by: NURSE PRACTITIONER

## 2019-01-22 PROCEDURE — 85025 COMPLETE CBC W/AUTO DIFF WBC: CPT | Performed by: NURSE PRACTITIONER

## 2019-01-22 PROCEDURE — 82962 GLUCOSE BLOOD TEST: CPT

## 2019-01-22 PROCEDURE — 84443 ASSAY THYROID STIM HORMONE: CPT | Performed by: NURSE PRACTITIONER

## 2019-01-22 RX ORDER — NICOTINE POLACRILEX 4 MG
15 LOZENGE BUCCAL
Status: DISCONTINUED | OUTPATIENT
Start: 2019-01-22 | End: 2019-01-26 | Stop reason: HOSPADM

## 2019-01-22 RX ORDER — ASPIRIN 81 MG/1
81 TABLET ORAL DAILY
Status: DISCONTINUED | OUTPATIENT
Start: 2019-01-23 | End: 2019-01-26 | Stop reason: HOSPADM

## 2019-01-22 RX ORDER — AMIODARONE HYDROCHLORIDE 200 MG/1
200 TABLET ORAL DAILY
Status: DISCONTINUED | OUTPATIENT
Start: 2019-01-23 | End: 2019-01-23

## 2019-01-22 RX ORDER — SODIUM CHLORIDE 0.9 % (FLUSH) 0.9 %
3 SYRINGE (ML) INJECTION EVERY 12 HOURS SCHEDULED
Status: DISCONTINUED | OUTPATIENT
Start: 2019-01-22 | End: 2019-01-26 | Stop reason: HOSPADM

## 2019-01-22 RX ORDER — ACETAMINOPHEN 325 MG/1
650 TABLET ORAL EVERY 4 HOURS PRN
Status: DISCONTINUED | OUTPATIENT
Start: 2019-01-22 | End: 2019-01-26 | Stop reason: HOSPADM

## 2019-01-22 RX ORDER — DEXTROSE MONOHYDRATE 25 G/50ML
25 INJECTION, SOLUTION INTRAVENOUS
Status: DISCONTINUED | OUTPATIENT
Start: 2019-01-22 | End: 2019-01-26 | Stop reason: HOSPADM

## 2019-01-22 RX ORDER — METOPROLOL SUCCINATE 50 MG/1
50 TABLET, EXTENDED RELEASE ORAL 2 TIMES DAILY
Status: DISCONTINUED | OUTPATIENT
Start: 2019-01-22 | End: 2019-01-26 | Stop reason: HOSPADM

## 2019-01-22 RX ORDER — CEPHALEXIN 500 MG/1
500 CAPSULE ORAL 2 TIMES DAILY
COMMUNITY
Start: 2019-01-18 | End: 2019-01-26 | Stop reason: HOSPADM

## 2019-01-22 RX ORDER — FUROSEMIDE 10 MG/ML
40 INJECTION INTRAMUSCULAR; INTRAVENOUS ONCE
Status: COMPLETED | OUTPATIENT
Start: 2019-01-23 | End: 2019-01-23

## 2019-01-22 RX ORDER — HYDROXYZINE HYDROCHLORIDE 25 MG/1
25 TABLET, FILM COATED ORAL 3 TIMES DAILY PRN
Status: DISCONTINUED | OUTPATIENT
Start: 2019-01-22 | End: 2019-01-26 | Stop reason: HOSPADM

## 2019-01-22 RX ORDER — PENTOXIFYLLINE 400 MG/1
400 TABLET, EXTENDED RELEASE ORAL 2 TIMES DAILY
Status: DISCONTINUED | OUTPATIENT
Start: 2019-01-22 | End: 2019-01-26 | Stop reason: HOSPADM

## 2019-01-22 RX ORDER — SODIUM CHLORIDE 0.9 % (FLUSH) 0.9 %
3-10 SYRINGE (ML) INJECTION AS NEEDED
Status: DISCONTINUED | OUTPATIENT
Start: 2019-01-22 | End: 2019-01-26 | Stop reason: HOSPADM

## 2019-01-22 RX ORDER — PANTOPRAZOLE SODIUM 40 MG/1
40 TABLET, DELAYED RELEASE ORAL
Status: DISCONTINUED | OUTPATIENT
Start: 2019-01-23 | End: 2019-01-26 | Stop reason: HOSPADM

## 2019-01-22 RX ADMIN — INSULIN LISPRO 3 UNITS: 100 INJECTION, SOLUTION INTRAVENOUS; SUBCUTANEOUS at 21:05

## 2019-01-22 RX ADMIN — METOPROLOL SUCCINATE 50 MG: 50 TABLET, FILM COATED, EXTENDED RELEASE ORAL at 21:05

## 2019-01-22 RX ADMIN — PENTOXIFYLLINE 400 MG: 400 TABLET, EXTENDED RELEASE ORAL at 21:05

## 2019-01-22 RX ADMIN — SODIUM CHLORIDE, PRESERVATIVE FREE 3 ML: 5 INJECTION INTRAVENOUS at 21:06

## 2019-01-22 RX ADMIN — APIXABAN 5 MG: 5 TABLET, FILM COATED ORAL at 21:05

## 2019-01-22 RX ADMIN — ACETAMINOPHEN 650 MG: 325 TABLET ORAL at 21:05

## 2019-01-22 NOTE — TELEPHONE ENCOUNTER
"Dtg phoned asking if pt could be seen sooner than 215 today.  She reports his shortness of air is \"much worse.\"  She is unsure if he has had any weight gain.  She states he can no longer walk and will need a wheelchair to make it to the office (which is different from 1/17).  She states she can hear him wheezing.  Spoke with KEYA Moreno.  She rec's needs to be seen in the ED first.  Dtg voiced understanding and stated she will see what he want to do.  In route to [facility] now.    Jyoti Hutchinson RN    "

## 2019-01-22 NOTE — PROGRESS NOTES
Encounter Date:01/22/2019      Patient ID: Monico Roman is a 73 y.o. male.        Subjective:     Chief Complaint: Establish Care (atrial flutter )     History of Present Illness 73 year old male with a very complicated history presents to the office today for ongoing evaluation of his atrial flutter. Patient notes that he received 2 stents (circumflex and RCA) by Dr Dumas in June 2018 at [facility]. 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 Patient has hx of severe GOLD class III COPD with DLCO 29%.  He notes that he had atrial flutter after thoractomy but rates were controlled with medications. He underwent an ECV with Dr Gardner at [facility] a few weeks ago and was started on amiodarone 400 mg bid x 1 week post ecv. He is now taking amiodarone 200 mg daily. His Toprol was increased to 50 mg bid recently by Dr Gardner. He then presented to [facility] ED on 1/17/19 with complaints of a rapid heart beat.He was given multiple doses of IV metoprolol. ECV was discussed but patient wanted to follow up with his Cardiologist the next morning in [place].  Venous duplex showed Bilateral upper extremity DVTS and eliquis was increased to 10 mg bid for 7 days. He has only completed 4 days of the higher dose.   He presents today noting significant weakness, fatigue, dyspnea, pedal edema. He denies chest pain or s/s of bleeding. Per patient report, he was started on Keflex 500 mg bid by Dr Gardner 1/18/19 for a soft tissue infection.   Patient Active Problem List   Diagnosis   • Coronary artery disease   • Myocardial infarction (CMS/HCC)   • CHF (congestive heart failure) (CMS/HCC)   • Borderline diabetes   • Cancer (CMS/HCC)   • Acute deep vein thrombosis (DVT) of upper  extremity (CMS/[facility])   • Hypertension   • Stented coronary artery   • COPD (chronic obstructive pulmonary disease) (CMS/[facility])       Past Surgical History:   Procedure Laterality Date   • LUNG REMOVAL, PARTIAL         No Known Allergies      Current Outpatient Medications:   •  amiodarone (PACERONE) 200 MG tablet, Take 200 mg by mouth Daily. Started on 1/15/19, 2 tablets twice daily for 7 days , then one tablet daily , Disp: , Rfl:   •  apixaban (ELIQUIS) 5 MG tablet tablet, Take 5 mg by mouth 2 (Two) Times a Day., Disp: , Rfl:   •  aspirin 81 MG EC tablet, Take 81 mg by mouth Daily., Disp: , Rfl:   •  cephalexin (KEFLEX) 500 MG capsule, Take 500 mg by mouth 2 (Two) Times a Day., Disp: , Rfl:   •  metoprolol succinate XL (TOPROL-XL) 50 MG 24 hr tablet, Take 50 mg by mouth 2 (Two) Times a Day. Started 1/15/19 , Disp: , Rfl:   •  pantoprazole (PROTONIX) 40 MG EC tablet, Take 40 mg by mouth Daily., Disp: , Rfl:   •  pentoxifylline (TRENtal) 400 MG CR tablet, Take 400 mg by mouth 2 (Two) Times a Day., Disp: , Rfl:     The following portions of the chart were reviewed and updated as appropriate: Allergies, current medications, past family history, social history, past medical history.     Review of Systems   Constitution: Positive for weakness and malaise/fatigue. Negative for chills, decreased appetite, diaphoresis, fever, night sweats, weight gain and weight loss.   HENT: Positive for congestion. Negative for hearing loss, hoarse voice and nosebleeds.    Eyes: Negative for blurred vision, visual disturbance and visual halos.   Cardiovascular: Positive for chest pain, dyspnea on exertion, irregular heartbeat, leg swelling, orthopnea and paroxysmal nocturnal dyspnea. Negative for claudication, cyanosis, near-syncope, palpitations and syncope.   Respiratory: Positive for cough, shortness of breath, sleep disturbances due to breathing, snoring and wheezing. Negative for hemoptysis and sputum production.    Endocrine: Positive  "for cold intolerance and polydipsia.   Hematologic/Lymphatic: Negative for bleeding problem. Does not bruise/bleed easily.   Skin: Negative for dry skin, itching and rash.   Musculoskeletal: Negative for arthritis, joint pain, joint swelling and myalgias.   Gastrointestinal: Negative for bloating, abdominal pain, constipation, diarrhea, flatus, heartburn, hematemesis, hematochezia, melena, nausea and vomiting.   Genitourinary: Negative for dysuria, frequency, hematuria, nocturia and urgency.   Neurological: Negative for excessive daytime sleepiness, dizziness, headaches, light-headedness and loss of balance.   Psychiatric/Behavioral: Negative for depression. The patient does not have insomnia and is not nervous/anxious.            Objective:     Vitals:    01/22/19 1340 01/22/19 1345   BP: 127/67 136/67   BP Location: Right arm Left arm   Patient Position: Sitting Sitting   Pulse: (!) 127 (!) 130   Resp: 18    Temp: 97.4 °F (36.3 °C)    TempSrc: Temporal    SpO2: 92%    Height: 200.7 cm (79\")          Physical Exam   Constitutional: He is oriented to person, place, and time. He appears well-developed and well-nourished. He is active and cooperative. No distress.   HENT:   Head: Normocephalic and atraumatic.   Mouth/Throat: Oropharynx is clear and moist.   Eyes: Conjunctivae and EOM are normal. Pupils are equal, round, and reactive to light.   Neck: Normal range of motion. Neck supple. No JVD present. No tracheal deviation present. No thyromegaly present.   Cardiovascular: Regular rhythm, normal heart sounds and intact distal pulses. Tachycardia present.   Pulmonary/Chest: Effort normal. He has rales.   Abdominal: Soft. Bowel sounds are normal. He exhibits no distension. There is no tenderness.   Musculoskeletal: Normal range of motion. He exhibits edema (3+ pitting edema noted BLEs).   Neurological: He is alert and oriented to person, place, and time.   Skin: Skin is warm, dry and intact.   Psychiatric: He has a " normal mood and affect. His behavior is normal.   Nursing note and vitals reviewed.      Lab and Diagnostic Review:      Results for orders placed or performed during the hospital encounter of 01/17/19   Comprehensive Metabolic Panel   Result Value Ref Range    Glucose 231 (H) 70 - 100 mg/dL    BUN 10 9 - 23 mg/dL    Creatinine 0.81 0.60 - 1.30 mg/dL    Sodium 136 132 - 146 mmol/L    Potassium 4.3 3.5 - 5.5 mmol/L    Chloride 99 99 - 109 mmol/L    CO2 29.0 20.0 - 31.0 mmol/L    Calcium 9.0 8.7 - 10.4 mg/dL    Total Protein 6.7 5.7 - 8.2 g/dL    Albumin 3.85 3.20 - 4.80 g/dL    ALT (SGPT) 45 (H) 7 - 40 U/L    AST (SGOT) 25 0 - 33 U/L    Alkaline Phosphatase 90 25 - 100 U/L    Total Bilirubin 0.3 0.3 - 1.2 mg/dL    eGFR Non African Amer 94 >60 mL/min/1.73    Globulin 2.9 gm/dL    A/G Ratio 1.4 (L) 1.5 - 2.5 g/dL    BUN/Creatinine Ratio 12.3 7.0 - 25.0    Anion Gap 8.0 3.0 - 11.0 mmol/L   Lipase   Result Value Ref Range    Lipase 24 6 - 51 U/L   BNP   Result Value Ref Range    .0 (H) 0.0 - 100.0 pg/mL   CBC Auto Differential   Result Value Ref Range    WBC 9.65 3.50 - 10.80 10*3/mm3    RBC 4.06 (L) 4.20 - 5.76 10*6/mm3    Hemoglobin 11.5 (L) 13.1 - 17.5 g/dL    Hematocrit 35.9 (L) 38.9 - 50.9 %    MCV 88.4 80.0 - 99.0 fL    MCH 28.3 27.0 - 31.0 pg    MCHC 32.0 32.0 - 36.0 g/dL    RDW 15.8 (H) 11.3 - 14.5 %    RDW-SD 50.8 37.0 - 54.0 fl    MPV 9.0 6.0 - 12.0 fL    Platelets 317 150 - 450 10*3/mm3    Neutrophil % 63.9 41.0 - 71.0 %    Lymphocyte % 21.8 (L) 24.0 - 44.0 %    Monocyte % 11.3 0.0 - 12.0 %    Eosinophil % 2.4 0.0 - 3.0 %    Basophil % 0.4 0.0 - 1.0 %    Immature Grans % 0.2 0.0 - 0.6 %    Neutrophils, Absolute 6.17 1.50 - 8.30 10*3/mm3    Lymphocytes, Absolute 2.10 0.60 - 4.80 10*3/mm3    Monocytes, Absolute 1.09 (H) 0.00 - 1.00 10*3/mm3    Eosinophils, Absolute 0.23 0.00 - 0.30 10*3/mm3    Basophils, Absolute 0.04 0.00 - 0.20 10*3/mm3    Immature Grans, Absolute 0.02 0.00 - 0.03 10*3/mm3   Protime-INR    Result Value Ref Range    Protime 15.2 (H) 11.2 - 14.3 Seconds    INR 1.26 (H) 0.85 - 1.16   aPTT   Result Value Ref Range    PTT 39.9 (H) 24.0 - 37.0 seconds   Magnesium   Result Value Ref Range    Magnesium 2.0 1.3 - 2.7 mg/dL   TSH   Result Value Ref Range    TSH 2.842 0.350 - 5.350 mIU/mL   T4, Free   Result Value Ref Range    Free T4 1.36 0.89 - 1.76 ng/dL   POC Troponin, Rapid   Result Value Ref Range    Troponin I 0.00 0.00 - 0.07 ng/mL   POC Troponin, Rapid   Result Value Ref Range    Troponin I 0.00 0.00 - 0.07 ng/mL   Light Blue Top   Result Value Ref Range    Extra Tube hold for add-on    Green Top (Gel)   Result Value Ref Range    Extra Tube Hold for add-ons.    Lavender Top   Result Value Ref Range    Extra Tube hold for add-on    Gold Top - SST   Result Value Ref Range    Extra Tube Hold for add-ons.    Duplex Venous Upper Extremity - Bilateral CAR   Result Value Ref Range    Right Internal Jugular Compress C     Right Internal Jugular Phasic Y     Right Internal Jugular Spont Y     Left Internal Jugular Compress C     Left Internal Jugular Phasic Y     Left Internal Jugular Spont Y     Right Subclavian Compress C     Right Subclavian Phasic Y     Right Subclavian Spont Y     Left Subclavian Compress C     Left Subclavian Phasic Y     Left Subclavian Spont Y     Right Axillary Compress C     Right Axillary Phasic Y     Right Axillary Spont Y     Left Axillary Compress C     Left Axillary Phasic Y     Left Axillary Spont Y     Right Brachial Compress C     Right Brachial Phasic Y     Right Brachial Spont Y     Left Brachial Compress C     Left Brachial Phasic Y     Left Brachial Spont Y     Right Radial Compress C     Right Radial Phasic Y     Right Radial Spont Y     Left Radial Compress C     Left Radial Phasic Y     Left Radial Spont Y     Right Ulnar Compress C     Right Ulnar Phasic Y     Right Ulnar Spont Y     Left Ulnar Compress C     Left Ulnar Phasic Y     Left Ulnar Spont Y     Right  Basilic Upper Compress P     Right Basilic Upper Thrombus A     Right Basilic Upper Color 1     Left Basilic Upper Compress C     Right Basilic Forearm Compress P     Right Basilic Forearm Thrombus A     Right Basilic Forearm Color 1     Left Basilic Forearm Compress N     Left Basilic Forearm Thrombus A     Left Basilic Forearm Color 1     Right Cephalic Upper Compress C     Left Cephalic Upper Compress C     Right Cephalic Forearm Compress P     Right Cephalic Forearm Thrombus A     Right Cephalic Forearm Color 1     Left Cephalic Forearm Compress C    BMP, BNP pending from today     Assessment and Plan:         1. Atrial flutter, unspecified type (CMS/HCC)  CHADS-VASc Risk Assessment            4       Total Score        1 CHF    1 Hypertension    1 Vascular Disease    1 Age 65-74        Anticoagulated with eliquis and denies any s/s of bleeding  Spoke with Dr Huerta who would like the hospitalist to admit the patient tonight for flutter ablation tomorrow. Eliquis to be continued, npo after midnight, improve rate control and possible echo in am when heart rate is more controlled.  I have spoken with Dr Melisa Dewitt, Hospitalist who has agreed to admit the patient today for flutter ablation with Dr Huerta tomorrow.   - ECG 12 Lead; Future    2. Hypervolemia, unspecified hypervolemia type  IV diuresis today in office. Patient received 80 mg lasix (NDC 9236-9616-42)today through a butterfly in left wrist  over slow IV push. During IV diuresis, vitals were monitored and stable. Please see IV diuresis record for those vitals. Patient voided 750ml in the office prior to discharge from the office. Butterfly was d/c'd and area was free of erythema, ecchymosis, or drainage.  Patient was  instructed to record urinary output for the next 24 hours.   - Basic Metabolic Panel    3. Shortness of breath     - BNP    4. Coronary artery disease involving native coronary artery of native heart without angina pectoris  Without  angina  BMS to RCA and circumflex per Dr Dumas June 2018    It has been a pleasure to participate in the care of this patient.  Patient was instructed to call the [facility] with any questions, concerns, or worsening symptoms.    I have reviewed [facility] records and have requested records from [facility].  It has been a pleasure to participate in the care of this patient.  Patient was instructed to call the [facility] with any questions, concerns, or worsening symptoms.    * Please note that portions of this note were completed with a voice recognition program. Efforts were made to edit the dictation but occasionally words are transcribed.

## 2019-01-23 ENCOUNTER — APPOINTMENT (OUTPATIENT)
Dept: CARDIOLOGY | Facility: HOSPITAL | Age: 74
End: 2019-01-23

## 2019-01-23 LAB
ANION GAP SERPL CALCULATED.3IONS-SCNC: 3 MMOL/L (ref 3–11)
APTT PPP: 38.3 SECONDS (ref 24–37)
BH CV ECHO MEAS - AO ROOT AREA (BSA CORRECTED): 1.7
BH CV ECHO MEAS - AO ROOT AREA: 13.2 CM^2
BH CV ECHO MEAS - AO ROOT DIAM: 4.1 CM
BH CV ECHO MEAS - BSA(HAYCOCK): 2.5 M^2
BH CV ECHO MEAS - BSA: 2.5 M^2
BH CV ECHO MEAS - BZI_BMI: 26.9 KILOGRAMS/M^2
BH CV ECHO MEAS - BZI_METRIC_HEIGHT: 200.7 CM
BH CV ECHO MEAS - BZI_METRIC_WEIGHT: 108.4 KG
BH CV ECHO MEAS - EDV(CUBED): 118.8 ML
BH CV ECHO MEAS - EDV(MOD-SP2): 141 ML
BH CV ECHO MEAS - EDV(MOD-SP4): 216 ML
BH CV ECHO MEAS - EDV(TEICH): 113.7 ML
BH CV ECHO MEAS - EF(CUBED): 54.1 %
BH CV ECHO MEAS - EF(MOD-BP): 54 %
BH CV ECHO MEAS - EF(MOD-SP2): 56.7 %
BH CV ECHO MEAS - EF(MOD-SP4): 50.5 %
BH CV ECHO MEAS - EF(TEICH): 45.8 %
BH CV ECHO MEAS - ESV(CUBED): 54.5 ML
BH CV ECHO MEAS - ESV(MOD-SP2): 61 ML
BH CV ECHO MEAS - ESV(MOD-SP4): 107 ML
BH CV ECHO MEAS - ESV(TEICH): 61.6 ML
BH CV ECHO MEAS - FS: 22.9 %
BH CV ECHO MEAS - IVS/LVPW: 1.2
BH CV ECHO MEAS - IVSD: 1.6 CM
BH CV ECHO MEAS - LA DIMENSION: 3.9 CM
BH CV ECHO MEAS - LA/AO: 0.96
BH CV ECHO MEAS - LAD MAJOR: 5.6 CM
BH CV ECHO MEAS - LAT PEAK E' VEL: 6.4 CM/SEC
BH CV ECHO MEAS - LATERAL E/E' RATIO: 12.4
BH CV ECHO MEAS - LV DIASTOLIC VOL/BSA (35-75): 87.9 ML/M^2
BH CV ECHO MEAS - LV MASS(C)D: 292.3 GRAMS
BH CV ECHO MEAS - LV MASS(C)DI: 118.9 GRAMS/M^2
BH CV ECHO MEAS - LV MAX PG: 3 MMHG
BH CV ECHO MEAS - LV MEAN PG: 1.2 MMHG
BH CV ECHO MEAS - LV SYSTOLIC VOL/BSA (12-30): 43.5 ML/M^2
BH CV ECHO MEAS - LV V1 MAX: 85.9 CM/SEC
BH CV ECHO MEAS - LV V1 MEAN: 50.2 CM/SEC
BH CV ECHO MEAS - LV V1 VTI: 18 CM
BH CV ECHO MEAS - LVIDD: 4.9 CM
BH CV ECHO MEAS - LVIDS: 3.8 CM
BH CV ECHO MEAS - LVLD AP2: 9.1 CM
BH CV ECHO MEAS - LVLD AP4: 9.7 CM
BH CV ECHO MEAS - LVLS AP2: 7.8 CM
BH CV ECHO MEAS - LVLS AP4: 8 CM
BH CV ECHO MEAS - LVOT AREA (M): 5.7 CM^2
BH CV ECHO MEAS - LVOT AREA: 5.8 CM^2
BH CV ECHO MEAS - LVOT DIAM: 2.7 CM
BH CV ECHO MEAS - LVPWD: 1.3 CM
BH CV ECHO MEAS - MED PEAK E' VEL: 5.8 CM/SEC
BH CV ECHO MEAS - MEDIAL E/E' RATIO: 13.6
BH CV ECHO MEAS - MV A MAX VEL: 57.3 CM/SEC
BH CV ECHO MEAS - MV E MAX VEL: 80.9 CM/SEC
BH CV ECHO MEAS - MV E/A: 1.4
BH CV ECHO MEAS - PA ACC SLOPE: 711.3 CM/SEC^2
BH CV ECHO MEAS - PA ACC TIME: 0.1 SEC
BH CV ECHO MEAS - PA PR(ACCEL): 36.2 MMHG
BH CV ECHO MEAS - RVDD: 3.1 CM
BH CV ECHO MEAS - SI(CUBED): 26.2 ML/M^2
BH CV ECHO MEAS - SI(LVOT): 42.8 ML/M^2
BH CV ECHO MEAS - SI(MOD-SP2): 32.5 ML/M^2
BH CV ECHO MEAS - SI(MOD-SP4): 44.3 ML/M^2
BH CV ECHO MEAS - SI(TEICH): 21.2 ML/M^2
BH CV ECHO MEAS - SV(CUBED): 64.3 ML
BH CV ECHO MEAS - SV(LVOT): 105.3 ML
BH CV ECHO MEAS - SV(MOD-SP2): 80 ML
BH CV ECHO MEAS - SV(MOD-SP4): 109 ML
BH CV ECHO MEAS - SV(TEICH): 52.1 ML
BH CV ECHO MEASUREMENTS AVERAGE E/E' RATIO: 13.26
BH CV VAS BP LEFT ARM: NORMAL MMHG
BH CV XLRA - RV BASE: 4.8 CM
BH CV XLRA - RV LENGTH: 7.3 CM
BH CV XLRA - RV MID: 3.4 CM
BH CV XLRA - TDI S': 12.6 CM/SEC
BUN BLD-MCNC: 20 MG/DL (ref 9–23)
BUN/CREAT SERPL: 23.8 (ref 7–25)
CALCIUM SPEC-SCNC: 8.8 MG/DL (ref 8.7–10.4)
CHLORIDE SERPL-SCNC: 98 MMOL/L (ref 99–109)
CO2 SERPL-SCNC: 33 MMOL/L (ref 20–31)
CREAT BLD-MCNC: 0.84 MG/DL (ref 0.6–1.3)
DEPRECATED RDW RBC AUTO: 51.8 FL (ref 37–54)
ERYTHROCYTE [DISTWIDTH] IN BLOOD BY AUTOMATED COUNT: 16.2 % (ref 11.3–14.5)
GFR SERPL CREATININE-BSD FRML MDRD: 90 ML/MIN/1.73
GLUCOSE BLD-MCNC: 112 MG/DL (ref 70–100)
GLUCOSE BLDC GLUCOMTR-MCNC: 119 MG/DL (ref 70–130)
GLUCOSE BLDC GLUCOMTR-MCNC: 188 MG/DL (ref 70–130)
GLUCOSE BLDC GLUCOMTR-MCNC: 98 MG/DL (ref 70–130)
HBA1C MFR BLD: 7.8 % (ref 4.8–5.6)
HCT VFR BLD AUTO: 31.8 % (ref 38.9–50.9)
HGB BLD-MCNC: 9.9 G/DL (ref 13.1–17.5)
INR PPP: 1.61 (ref 0.85–1.16)
LEFT ATRIUM VOLUME INDEX: 44.8 ML/M^2
LEFT ATRIUM VOLUME: 110 ML
MAXIMAL PREDICTED HEART RATE: 148 BPM
MCH RBC QN AUTO: 27.2 PG (ref 27–31)
MCHC RBC AUTO-ENTMCNC: 31.1 G/DL (ref 32–36)
MCV RBC AUTO: 87.4 FL (ref 80–99)
PLATELET # BLD AUTO: 288 10*3/MM3 (ref 150–450)
PMV BLD AUTO: 9.1 FL (ref 6–12)
POTASSIUM BLD-SCNC: 4 MMOL/L (ref 3.5–5.5)
PROTHROMBIN TIME: 18.4 SECONDS (ref 11.2–14.3)
RBC # BLD AUTO: 3.64 10*6/MM3 (ref 4.2–5.76)
SODIUM BLD-SCNC: 134 MMOL/L (ref 132–146)
STRESS TARGET HR: 126 BPM
WBC NRBC COR # BLD: 10.64 10*3/MM3 (ref 3.5–10.8)

## 2019-01-23 PROCEDURE — 93306 TTE W/DOPPLER COMPLETE: CPT | Performed by: INTERNAL MEDICINE

## 2019-01-23 PROCEDURE — 25010000002 FUROSEMIDE PER 20 MG: Performed by: NURSE PRACTITIONER

## 2019-01-23 PROCEDURE — 0W9D3ZZ DRAINAGE OF PERICARDIAL CAVITY, PERCUTANEOUS APPROACH: ICD-10-PCS | Performed by: INTERNAL MEDICINE

## 2019-01-23 PROCEDURE — 82962 GLUCOSE BLOOD TEST: CPT

## 2019-01-23 PROCEDURE — 97162 PT EVAL MOD COMPLEX 30 MIN: CPT

## 2019-01-23 PROCEDURE — 85730 THROMBOPLASTIN TIME PARTIAL: CPT | Performed by: NURSE PRACTITIONER

## 2019-01-23 PROCEDURE — 80048 BASIC METABOLIC PNL TOTAL CA: CPT | Performed by: NURSE PRACTITIONER

## 2019-01-23 PROCEDURE — 99222 1ST HOSP IP/OBS MODERATE 55: CPT | Performed by: INTERNAL MEDICINE

## 2019-01-23 PROCEDURE — 99233 SBSQ HOSP IP/OBS HIGH 50: CPT | Performed by: HOSPITALIST

## 2019-01-23 PROCEDURE — 85027 COMPLETE CBC AUTOMATED: CPT | Performed by: NURSE PRACTITIONER

## 2019-01-23 PROCEDURE — 83036 HEMOGLOBIN GLYCOSYLATED A1C: CPT | Performed by: NURSE PRACTITIONER

## 2019-01-23 PROCEDURE — 93306 TTE W/DOPPLER COMPLETE: CPT

## 2019-01-23 PROCEDURE — 85610 PROTHROMBIN TIME: CPT | Performed by: NURSE PRACTITIONER

## 2019-01-23 RX ADMIN — PENTOXIFYLLINE 400 MG: 400 TABLET, EXTENDED RELEASE ORAL at 22:06

## 2019-01-23 RX ADMIN — SODIUM CHLORIDE, PRESERVATIVE FREE 3 ML: 5 INJECTION INTRAVENOUS at 22:11

## 2019-01-23 RX ADMIN — HYDROXYZINE HYDROCHLORIDE 25 MG: 25 TABLET, FILM COATED ORAL at 22:07

## 2019-01-23 RX ADMIN — ASPIRIN 81 MG: 81 TABLET, COATED ORAL at 09:24

## 2019-01-23 RX ADMIN — PANTOPRAZOLE SODIUM 40 MG: 40 TABLET, DELAYED RELEASE ORAL at 06:01

## 2019-01-23 RX ADMIN — METOPROLOL SUCCINATE 50 MG: 50 TABLET, FILM COATED, EXTENDED RELEASE ORAL at 09:24

## 2019-01-23 RX ADMIN — METOPROLOL SUCCINATE 50 MG: 50 TABLET, FILM COATED, EXTENDED RELEASE ORAL at 22:07

## 2019-01-23 RX ADMIN — PENTOXIFYLLINE 400 MG: 400 TABLET, EXTENDED RELEASE ORAL at 09:24

## 2019-01-23 RX ADMIN — FUROSEMIDE 40 MG: 10 INJECTION, SOLUTION INTRAMUSCULAR; INTRAVENOUS at 09:24

## 2019-01-23 RX ADMIN — SODIUM CHLORIDE, PRESERVATIVE FREE 3 ML: 5 INJECTION INTRAVENOUS at 09:25

## 2019-01-23 RX ADMIN — ACETAMINOPHEN 650 MG: 325 TABLET ORAL at 22:05

## 2019-01-23 RX ADMIN — INSULIN LISPRO 2 UNITS: 100 INJECTION, SOLUTION INTRAVENOUS; SUBCUTANEOUS at 22:06

## 2019-01-24 LAB
APPEARANCE FLD: ABNORMAL
COLOR FLD: ABNORMAL
EOSINOPHIL NFR FLD MANUAL: 1 %
GLUCOSE BLDC GLUCOMTR-MCNC: 102 MG/DL (ref 70–130)
GLUCOSE BLDC GLUCOMTR-MCNC: 112 MG/DL (ref 70–130)
GLUCOSE BLDC GLUCOMTR-MCNC: 177 MG/DL (ref 70–130)
GLUCOSE BLDC GLUCOMTR-MCNC: 221 MG/DL (ref 70–130)
GLUCOSE FLD-MCNC: 64 MG/DL
LDH FLD-CCNC: 1380 U/L
LYMPHOCYTES NFR FLD MANUAL: 24 %
MESOTHL CELL NFR FLD MANUAL: 1 %
MONOCYTES NFR FLD: 8 %
NEUTROPHILS NFR FLD MANUAL: 66 %
PROT FLD-MCNC: 4.8 G/DL
RBC # FLD AUTO: ABNORMAL /MM3
WBC # FLD AUTO: 5359 /MM3

## 2019-01-24 PROCEDURE — 33010 HC PERICARDIOCENTESIS INITIAL: CPT | Performed by: INTERNAL MEDICINE

## 2019-01-24 PROCEDURE — 83615 LACTATE (LD) (LDH) ENZYME: CPT | Performed by: HOSPITALIST

## 2019-01-24 PROCEDURE — 25010000002 MIDAZOLAM PER 1 MG: Performed by: INTERNAL MEDICINE

## 2019-01-24 PROCEDURE — 87205 SMEAR GRAM STAIN: CPT | Performed by: INTERNAL MEDICINE

## 2019-01-24 PROCEDURE — 82962 GLUCOSE BLOOD TEST: CPT

## 2019-01-24 PROCEDURE — 33010 PR PERICARDIOCENTESIS INITIAL: CPT | Performed by: INTERNAL MEDICINE

## 2019-01-24 PROCEDURE — 84157 ASSAY OF PROTEIN OTHER: CPT | Performed by: HOSPITALIST

## 2019-01-24 PROCEDURE — 77001 FLUOROGUIDE FOR VEIN DEVICE: CPT | Performed by: INTERNAL MEDICINE

## 2019-01-24 PROCEDURE — 97110 THERAPEUTIC EXERCISES: CPT

## 2019-01-24 PROCEDURE — 88112 CYTOPATH CELL ENHANCE TECH: CPT | Performed by: INTERNAL MEDICINE

## 2019-01-24 PROCEDURE — 99232 SBSQ HOSP IP/OBS MODERATE 35: CPT | Performed by: PHYSICIAN ASSISTANT

## 2019-01-24 PROCEDURE — 88305 TISSUE EXAM BY PATHOLOGIST: CPT | Performed by: INTERNAL MEDICINE

## 2019-01-24 PROCEDURE — 93010 ELECTROCARDIOGRAM REPORT: CPT | Performed by: INTERNAL MEDICINE

## 2019-01-24 PROCEDURE — 89051 BODY FLUID CELL COUNT: CPT | Performed by: INTERNAL MEDICINE

## 2019-01-24 PROCEDURE — 25010000002 FENTANYL CITRATE (PF) 100 MCG/2ML SOLUTION: Performed by: INTERNAL MEDICINE

## 2019-01-24 PROCEDURE — 87015 SPECIMEN INFECT AGNT CONCNTJ: CPT | Performed by: INTERNAL MEDICINE

## 2019-01-24 PROCEDURE — 82042 OTHER SOURCE ALBUMIN QUAN EA: CPT | Performed by: HOSPITALIST

## 2019-01-24 PROCEDURE — 93005 ELECTROCARDIOGRAM TRACING: CPT | Performed by: INTERNAL MEDICINE

## 2019-01-24 PROCEDURE — 77003 FLUOROGUIDE FOR SPINE INJECT: CPT | Performed by: INTERNAL MEDICINE

## 2019-01-24 PROCEDURE — 87206 SMEAR FLUORESCENT/ACID STAI: CPT | Performed by: INTERNAL MEDICINE

## 2019-01-24 PROCEDURE — 87070 CULTURE OTHR SPECIMN AEROBIC: CPT | Performed by: INTERNAL MEDICINE

## 2019-01-24 PROCEDURE — 82945 GLUCOSE OTHER FLUID: CPT | Performed by: HOSPITALIST

## 2019-01-24 PROCEDURE — C1729 CATH, DRAINAGE: HCPCS | Performed by: INTERNAL MEDICINE

## 2019-01-24 PROCEDURE — 87116 MYCOBACTERIA CULTURE: CPT | Performed by: INTERNAL MEDICINE

## 2019-01-24 PROCEDURE — 99232 SBSQ HOSP IP/OBS MODERATE 35: CPT | Performed by: HOSPITALIST

## 2019-01-24 PROCEDURE — 97116 GAIT TRAINING THERAPY: CPT

## 2019-01-24 RX ORDER — MIDAZOLAM HYDROCHLORIDE 1 MG/ML
INJECTION INTRAMUSCULAR; INTRAVENOUS AS NEEDED
Status: DISCONTINUED | OUTPATIENT
Start: 2019-01-24 | End: 2019-01-24 | Stop reason: HOSPADM

## 2019-01-24 RX ORDER — FENTANYL CITRATE 50 UG/ML
INJECTION, SOLUTION INTRAMUSCULAR; INTRAVENOUS AS NEEDED
Status: DISCONTINUED | OUTPATIENT
Start: 2019-01-24 | End: 2019-01-24 | Stop reason: HOSPADM

## 2019-01-24 RX ORDER — LIDOCAINE HYDROCHLORIDE 10 MG/ML
INJECTION, SOLUTION INFILTRATION; PERINEURAL AS NEEDED
Status: DISCONTINUED | OUTPATIENT
Start: 2019-01-24 | End: 2019-01-24 | Stop reason: HOSPADM

## 2019-01-24 RX ADMIN — ASPIRIN 81 MG: 81 TABLET, COATED ORAL at 09:14

## 2019-01-24 RX ADMIN — METOPROLOL SUCCINATE 50 MG: 50 TABLET, FILM COATED, EXTENDED RELEASE ORAL at 14:18

## 2019-01-24 RX ADMIN — INSULIN LISPRO 2 UNITS: 100 INJECTION, SOLUTION INTRAVENOUS; SUBCUTANEOUS at 18:09

## 2019-01-24 RX ADMIN — SODIUM CHLORIDE, PRESERVATIVE FREE 3 ML: 5 INJECTION INTRAVENOUS at 09:14

## 2019-01-24 RX ADMIN — INSULIN LISPRO 3 UNITS: 100 INJECTION, SOLUTION INTRAVENOUS; SUBCUTANEOUS at 21:36

## 2019-01-24 RX ADMIN — PENTOXIFYLLINE 400 MG: 400 TABLET, EXTENDED RELEASE ORAL at 21:36

## 2019-01-24 RX ADMIN — METOPROLOL SUCCINATE 50 MG: 50 TABLET, FILM COATED, EXTENDED RELEASE ORAL at 21:36

## 2019-01-24 RX ADMIN — SODIUM CHLORIDE, PRESERVATIVE FREE 3 ML: 5 INJECTION INTRAVENOUS at 21:36

## 2019-01-24 RX ADMIN — PANTOPRAZOLE SODIUM 40 MG: 40 TABLET, DELAYED RELEASE ORAL at 06:30

## 2019-01-24 RX ADMIN — PENTOXIFYLLINE 400 MG: 400 TABLET, EXTENDED RELEASE ORAL at 09:14

## 2019-01-24 RX ADMIN — ACETAMINOPHEN 650 MG: 325 TABLET ORAL at 06:30

## 2019-01-24 RX ADMIN — HYDROXYZINE HYDROCHLORIDE 25 MG: 25 TABLET, FILM COATED ORAL at 21:36

## 2019-01-25 ENCOUNTER — APPOINTMENT (OUTPATIENT)
Dept: CARDIOLOGY | Facility: HOSPITAL | Age: 74
End: 2019-01-25
Attending: INTERNAL MEDICINE

## 2019-01-25 LAB
ALBUMIN FLD-MCNC: 2.6 G/DL
BH CV ECHO MEAS - BSA(HAYCOCK): 2.4 M^2
BH CV ECHO MEAS - BSA: 2.4 M^2
BH CV ECHO MEAS - BZI_BMI: 26.1 KILOGRAMS/M^2
BH CV ECHO MEAS - BZI_METRIC_HEIGHT: 200.7 CM
BH CV ECHO MEAS - BZI_METRIC_WEIGHT: 105.2 KG
GLUCOSE BLDC GLUCOMTR-MCNC: 204 MG/DL (ref 70–130)
GLUCOSE BLDC GLUCOMTR-MCNC: 345 MG/DL (ref 70–130)
GLUCOSE BLDC GLUCOMTR-MCNC: 80 MG/DL (ref 70–130)
GLUCOSE BLDC GLUCOMTR-MCNC: 91 MG/DL (ref 70–130)
LAB AP CASE REPORT: NORMAL
MAXIMAL PREDICTED HEART RATE: 148 BPM
PATH REPORT.FINAL DX SPEC: NORMAL
STRESS TARGET HR: 126 BPM

## 2019-01-25 PROCEDURE — C1733 CATH, EP, OTHR THAN COOL-TIP: HCPCS | Performed by: INTERNAL MEDICINE

## 2019-01-25 PROCEDURE — 93621 COMP EP EVL L PAC&REC C SINS: CPT | Performed by: INTERNAL MEDICINE

## 2019-01-25 PROCEDURE — 99232 SBSQ HOSP IP/OBS MODERATE 35: CPT | Performed by: INTERNAL MEDICINE

## 2019-01-25 PROCEDURE — 02583ZZ DESTRUCTION OF CONDUCTION MECHANISM, PERCUTANEOUS APPROACH: ICD-10-PCS | Performed by: INTERNAL MEDICINE

## 2019-01-25 PROCEDURE — 99232 SBSQ HOSP IP/OBS MODERATE 35: CPT | Performed by: HOSPITALIST

## 2019-01-25 PROCEDURE — 02K83ZZ MAP CONDUCTION MECHANISM, PERCUTANEOUS APPROACH: ICD-10-PCS | Performed by: INTERNAL MEDICINE

## 2019-01-25 PROCEDURE — 25010000002 MIDAZOLAM PER 1 MG: Performed by: INTERNAL MEDICINE

## 2019-01-25 PROCEDURE — 93308 TTE F-UP OR LMTD: CPT | Performed by: INTERNAL MEDICINE

## 2019-01-25 PROCEDURE — 93653 COMPRE EP EVAL TX SVT: CPT | Performed by: INTERNAL MEDICINE

## 2019-01-25 PROCEDURE — 93308 TTE F-UP OR LMTD: CPT

## 2019-01-25 PROCEDURE — C1730 CATH, EP, 19 OR FEW ELECT: HCPCS | Performed by: INTERNAL MEDICINE

## 2019-01-25 PROCEDURE — 93609 INTRA-VNTR MAPG TCHYCAR SITE: CPT | Performed by: INTERNAL MEDICINE

## 2019-01-25 PROCEDURE — C1894 INTRO/SHEATH, NON-LASER: HCPCS | Performed by: INTERNAL MEDICINE

## 2019-01-25 PROCEDURE — 4A023FZ MEASUREMENT OF CARDIAC RHYTHM, PERCUTANEOUS APPROACH: ICD-10-PCS | Performed by: INTERNAL MEDICINE

## 2019-01-25 PROCEDURE — 82962 GLUCOSE BLOOD TEST: CPT

## 2019-01-25 PROCEDURE — C1731 CATH, EP, 20 OR MORE ELEC: HCPCS | Performed by: INTERNAL MEDICINE

## 2019-01-25 PROCEDURE — 25010000002 FENTANYL CITRATE (PF) 100 MCG/2ML SOLUTION: Performed by: INTERNAL MEDICINE

## 2019-01-25 PROCEDURE — 99152 MOD SED SAME PHYS/QHP 5/>YRS: CPT | Performed by: INTERNAL MEDICINE

## 2019-01-25 RX ORDER — MIDAZOLAM HYDROCHLORIDE 1 MG/ML
INJECTION INTRAMUSCULAR; INTRAVENOUS AS NEEDED
Status: DISCONTINUED | OUTPATIENT
Start: 2019-01-25 | End: 2019-01-25 | Stop reason: HOSPADM

## 2019-01-25 RX ORDER — SODIUM CHLORIDE 9 MG/ML
INJECTION, SOLUTION INTRAVENOUS CONTINUOUS PRN
Status: COMPLETED | OUTPATIENT
Start: 2019-01-25 | End: 2019-01-25

## 2019-01-25 RX ORDER — ACETAMINOPHEN 650 MG/1
650 SUPPOSITORY RECTAL EVERY 4 HOURS PRN
Status: DISCONTINUED | OUTPATIENT
Start: 2019-01-25 | End: 2019-01-26 | Stop reason: HOSPADM

## 2019-01-25 RX ORDER — FENTANYL CITRATE 50 UG/ML
INJECTION, SOLUTION INTRAMUSCULAR; INTRAVENOUS AS NEEDED
Status: DISCONTINUED | OUTPATIENT
Start: 2019-01-25 | End: 2019-01-25 | Stop reason: HOSPADM

## 2019-01-25 RX ORDER — IBUPROFEN 400 MG/1
400 TABLET ORAL EVERY 6 HOURS PRN
Status: DISCONTINUED | OUTPATIENT
Start: 2019-01-25 | End: 2019-01-26 | Stop reason: HOSPADM

## 2019-01-25 RX ORDER — LIDOCAINE HYDROCHLORIDE 10 MG/ML
INJECTION, SOLUTION INFILTRATION; PERINEURAL AS NEEDED
Status: DISCONTINUED | OUTPATIENT
Start: 2019-01-25 | End: 2019-01-25 | Stop reason: HOSPADM

## 2019-01-25 RX ORDER — ACETAMINOPHEN 160 MG/5ML
650 SOLUTION ORAL EVERY 4 HOURS PRN
Status: DISCONTINUED | OUTPATIENT
Start: 2019-01-25 | End: 2019-01-26 | Stop reason: HOSPADM

## 2019-01-25 RX ORDER — ACETAMINOPHEN 325 MG/1
650 TABLET ORAL EVERY 4 HOURS PRN
Status: DISCONTINUED | OUTPATIENT
Start: 2019-01-25 | End: 2019-01-26 | Stop reason: HOSPADM

## 2019-01-25 RX ADMIN — HYDROXYZINE HYDROCHLORIDE 25 MG: 25 TABLET, FILM COATED ORAL at 20:25

## 2019-01-25 RX ADMIN — METOPROLOL SUCCINATE 50 MG: 50 TABLET, FILM COATED, EXTENDED RELEASE ORAL at 20:25

## 2019-01-25 RX ADMIN — INSULIN LISPRO 3 UNITS: 100 INJECTION, SOLUTION INTRAVENOUS; SUBCUTANEOUS at 08:43

## 2019-01-25 RX ADMIN — ACETAMINOPHEN 650 MG: 325 TABLET ORAL at 20:25

## 2019-01-25 RX ADMIN — ASPIRIN 81 MG: 81 TABLET, COATED ORAL at 08:43

## 2019-01-25 RX ADMIN — PENTOXIFYLLINE 400 MG: 400 TABLET, EXTENDED RELEASE ORAL at 08:43

## 2019-01-25 RX ADMIN — PANTOPRAZOLE SODIUM 40 MG: 40 TABLET, DELAYED RELEASE ORAL at 06:06

## 2019-01-25 RX ADMIN — METOPROLOL SUCCINATE 50 MG: 50 TABLET, FILM COATED, EXTENDED RELEASE ORAL at 08:43

## 2019-01-25 RX ADMIN — INSULIN LISPRO 5 UNITS: 100 INJECTION, SOLUTION INTRAVENOUS; SUBCUTANEOUS at 20:31

## 2019-01-25 RX ADMIN — PENTOXIFYLLINE 400 MG: 400 TABLET, EXTENDED RELEASE ORAL at 20:29

## 2019-01-25 RX ADMIN — SODIUM CHLORIDE, PRESERVATIVE FREE 3 ML: 5 INJECTION INTRAVENOUS at 20:31

## 2019-01-26 VITALS
OXYGEN SATURATION: 95 % | TEMPERATURE: 98 F | DIASTOLIC BLOOD PRESSURE: 65 MMHG | WEIGHT: 234.6 LBS | HEART RATE: 64 BPM | HEIGHT: 78 IN | BODY MASS INDEX: 27.14 KG/M2 | RESPIRATION RATE: 18 BRPM | SYSTOLIC BLOOD PRESSURE: 109 MMHG

## 2019-01-26 PROBLEM — C34.91 NON-SMALL CELL CANCER OF RIGHT LUNG: Status: ACTIVE | Noted: 2019-01-26

## 2019-01-26 PROBLEM — I31.39 PERICARDIAL EFFUSION: Status: ACTIVE | Noted: 2019-01-26

## 2019-01-26 PROBLEM — I48.92 ATRIAL FLUTTER WITH RAPID VENTRICULAR RESPONSE: Status: ACTIVE | Noted: 2019-01-22

## 2019-01-26 LAB
GLUCOSE BLDC GLUCOMTR-MCNC: 137 MG/DL (ref 70–130)
GLUCOSE BLDC GLUCOMTR-MCNC: 243 MG/DL (ref 70–130)

## 2019-01-26 PROCEDURE — 99239 HOSP IP/OBS DSCHRG MGMT >30: CPT | Performed by: INTERNAL MEDICINE

## 2019-01-26 PROCEDURE — 99231 SBSQ HOSP IP/OBS SF/LOW 25: CPT | Performed by: PHYSICIAN ASSISTANT

## 2019-01-26 PROCEDURE — 82962 GLUCOSE BLOOD TEST: CPT

## 2019-01-26 RX ADMIN — METOPROLOL SUCCINATE 50 MG: 50 TABLET, FILM COATED, EXTENDED RELEASE ORAL at 09:32

## 2019-01-26 RX ADMIN — ASPIRIN 81 MG: 81 TABLET, COATED ORAL at 09:32

## 2019-01-26 RX ADMIN — PENTOXIFYLLINE 400 MG: 400 TABLET, EXTENDED RELEASE ORAL at 09:32

## 2019-01-26 RX ADMIN — INSULIN LISPRO 3 UNITS: 100 INJECTION, SOLUTION INTRAVENOUS; SUBCUTANEOUS at 11:57

## 2019-01-26 RX ADMIN — PANTOPRAZOLE SODIUM 40 MG: 40 TABLET, DELAYED RELEASE ORAL at 05:10

## 2019-01-27 ENCOUNTER — READMISSION MANAGEMENT (OUTPATIENT)
Dept: CALL CENTER | Facility: HOSPITAL | Age: 74
End: 2019-01-27

## 2019-01-27 NOTE — OUTREACH NOTE
Prep Survey      Responses   Facility patient discharged from?  [place]   Is patient eligible?  Yes   Discharge diagnosis  Atrial flutter with rapid ventricular response    Does the patient have one of the following disease processes/diagnoses(primary or secondary)?  Other   Does the patient have Home health ordered?  Yes   What is the Home health agency?   [facility]    Is there a DME ordered?  Yes   What DME was ordered?  [facility]   Prep survey completed?  Yes          Amber Herrera RN

## 2019-01-28 ENCOUNTER — READMISSION MANAGEMENT (OUTPATIENT)
Dept: CALL CENTER | Facility: HOSPITAL | Age: 74
End: 2019-01-28

## 2019-01-28 NOTE — OUTREACH NOTE
Medical Week 1 Survey      Responses   Facility patient discharged from?  [place]   Does the patient have one of the following disease processes/diagnoses(primary or secondary)?  Other   Is there a successful TCM telephone encounter documented?  No   Week 1 attempt successful?  Yes   Call start time  1844   Call end time  1849   Discharge diagnosis  Atrial flutter with rapid ventricular response    Is patient permission given to speak with other caregiver?  Yes   Person spoke with today (if not patient) and relationship  Rae   Meds reviewed with patient/caregiver?  Yes   Is the patient having any side effects they believe may be caused by any medication additions or changes?  No   Does the patient have all medications ordered at discharge?  Yes   Is the patient taking all medications as directed (includes completed medication regime)?  Yes   Does the patient have a primary care provider?   Yes   Does the patient have an appointment with their PCP within 7 days of discharge?  Yes   Has the patient kept scheduled appointments due by today?  Yes   Comments  Will call Dr. Arriaga   What is the Home health agency?   [facility]    Has home health visited the patient within 72 hours of discharge?  Yes   Psychosocial issues?  No   Did the patient receive a copy of their discharge instructions?  Yes   Nursing interventions  Reviewed instructions with patient   What is the patient's perception of their health status since discharge?  Improving   Is the patient/caregiver able to teach back signs and symptoms related to disease process for when to call PCP?  Yes   Is the patient/caregiver able to teach back signs and symptoms related to disease process for when to call 911?  Yes   Is the patient/caregiver able to teach back the hierarchy of who to call/visit for symptoms/problems? PCP, Specialist, Home health nurse, Urgent Care, ED, 911  Yes   Additional teach back comments  Encouraged to f/u w/ PCP next week.  Cath site looks  good per wife.   Week 1 call completed?  Yes          Ashtyn Landaverde RN

## 2019-01-31 LAB
BACTERIA FLD CULT: NORMAL
GRAM STN SPEC: NORMAL

## 2019-02-01 ENCOUNTER — HOSPITAL ENCOUNTER (OUTPATIENT)
Dept: CARDIOLOGY | Facility: HOSPITAL | Age: 74
Discharge: HOME | End: 2019-02-01
Admitting: NURSE PRACTITIONER

## 2019-02-01 ENCOUNTER — OFFICE VISIT (OUTPATIENT)
Dept: CARDIOLOGY | Facility: HOSPITAL | Age: 74
End: 2019-02-01

## 2019-02-01 VITALS
WEIGHT: 238 LBS | HEART RATE: 65 BPM | SYSTOLIC BLOOD PRESSURE: 138 MMHG | BODY MASS INDEX: 27.54 KG/M2 | OXYGEN SATURATION: 94 % | DIASTOLIC BLOOD PRESSURE: 68 MMHG | TEMPERATURE: 97.5 F | RESPIRATION RATE: 16 BRPM | HEIGHT: 78 IN

## 2019-02-01 DIAGNOSIS — I48.3 TYPICAL ATRIAL FLUTTER (HCC): Primary | ICD-10-CM

## 2019-02-01 DIAGNOSIS — J44.9 CHRONIC OBSTRUCTIVE PULMONARY DISEASE, UNSPECIFIED COPD TYPE (HCC): ICD-10-CM

## 2019-02-01 DIAGNOSIS — I48.92 ATRIAL FLUTTER WITH RAPID VENTRICULAR RESPONSE (HCC): ICD-10-CM

## 2019-02-01 DIAGNOSIS — I31.39 PERICARDIAL EFFUSION: ICD-10-CM

## 2019-02-01 DIAGNOSIS — E87.70 HYPERVOLEMIA, UNSPECIFIED HYPERVOLEMIA TYPE: ICD-10-CM

## 2019-02-01 LAB
ANION GAP SERPL CALCULATED.3IONS-SCNC: 6 MMOL/L (ref 3–11)
BUN BLD-MCNC: 14 MG/DL (ref 9–23)
BUN/CREAT SERPL: 17.7 (ref 7–25)
CALCIUM SPEC-SCNC: 8.5 MG/DL (ref 8.7–10.4)
CHLORIDE SERPL-SCNC: 98 MMOL/L (ref 99–109)
CO2 SERPL-SCNC: 30 MMOL/L (ref 20–31)
CREAT BLD-MCNC: 0.79 MG/DL (ref 0.6–1.3)
GFR SERPL CREATININE-BSD FRML MDRD: 96 ML/MIN/1.73
GLUCOSE BLD-MCNC: 190 MG/DL (ref 70–100)
POTASSIUM BLD-SCNC: 4.3 MMOL/L (ref 3.5–5.5)
SODIUM BLD-SCNC: 134 MMOL/L (ref 132–146)

## 2019-02-01 PROCEDURE — 80048 BASIC METABOLIC PNL TOTAL CA: CPT | Performed by: NURSE PRACTITIONER

## 2019-02-01 PROCEDURE — 99214 OFFICE O/P EST MOD 30 MIN: CPT | Performed by: NURSE PRACTITIONER

## 2019-02-01 PROCEDURE — 93005 ELECTROCARDIOGRAM TRACING: CPT | Performed by: NURSE PRACTITIONER

## 2019-02-01 RX ORDER — FUROSEMIDE 40 MG/1
40 TABLET ORAL DAILY
Qty: 14 TABLET | Refills: 0 | Status: SHIPPED | OUTPATIENT
Start: 2019-02-01 | End: 2019-02-05

## 2019-02-01 RX ORDER — POTASSIUM CHLORIDE 750 MG/1
10 TABLET, FILM COATED, EXTENDED RELEASE ORAL DAILY
Qty: 14 TABLET | Refills: 0 | Status: SHIPPED | OUTPATIENT
Start: 2019-02-01 | End: 2019-02-05

## 2019-02-04 ENCOUNTER — TELEPHONE (OUTPATIENT)
Dept: CARDIOLOGY | Facility: HOSPITAL | Age: 74
End: 2019-02-04

## 2019-02-04 RX ORDER — METOPROLOL SUCCINATE 100 MG/1
100 TABLET, EXTENDED RELEASE ORAL 2 TIMES DAILY
Qty: 60 TABLET | Refills: 5 | Status: SHIPPED | OUTPATIENT
Start: 2019-02-04

## 2019-02-04 NOTE — TELEPHONE ENCOUNTER
Patient's wife called the office noting that Monico's heart rate has been elevated in the 120s since Saturday evening. (S/P flutter ablation with Dr Huerta 1/25/19) She notes bp 115/82. She notes that pedal edema has resolved. Patient to discontinue lasix and KCl.   Patient to increase toprol to 100 mg bid. She was instructed to continue recordings hr and bp. Patient to receive a follow up call from af center tomorrow.

## 2019-02-05 ENCOUNTER — READMISSION MANAGEMENT (OUTPATIENT)
Dept: CALL CENTER | Facility: HOSPITAL | Age: 74
End: 2019-02-05

## 2019-02-05 ENCOUNTER — TELEPHONE (OUTPATIENT)
Dept: CARDIOLOGY | Facility: HOSPITAL | Age: 74
End: 2019-02-05

## 2019-02-05 DIAGNOSIS — I48.3 TYPICAL ATRIAL FLUTTER (HCC): Primary | ICD-10-CM

## 2019-02-05 RX ORDER — DIGOXIN 250 MCG
250 TABLET ORAL DAILY
Qty: 30 TABLET | Refills: 11 | Status: SHIPPED | OUTPATIENT
Start: 2019-02-05 | End: 2019-03-08

## 2019-02-05 NOTE — OUTREACH NOTE
Medical Week 2 Survey      Responses   Facility patient discharged from?  [place]   Does the patient have one of the following disease processes/diagnoses(primary or secondary)?  Other   Week 2 attempt successful?  Yes   Call start time  1110   Discharge diagnosis  Atrial flutter with rapid ventricular response    Call end time  1119   Is patient permission given to speak with other caregiver?  Yes   List who call center can speak with  Wife   Person spoke with today (if not patient) and relationship  wife   Meds reviewed with patient/caregiver?  Yes   Is the patient taking all medications as directed (includes completed medication regime)?  Yes   Medication comments  increased beta blocker   Has the patient kept scheduled appointments due by today?  Yes   Psychosocial issues?  No   What is the patient's perception of their health status since discharge?  Same [had some issues with increased HR--pt is not having SOA, dizziness or further symptoms]   Is the patient/caregiver able to teach back signs and symptoms related to disease process for when to call PCP?  Yes   Is the patient/caregiver able to teach back signs and symptoms related to disease process for when to call 911?  Yes   Is the patient/caregiver able to teach back the hierarchy of who to call/visit for symptoms/problems? PCP, Specialist, Home health nurse, Urgent Care, ED, 911  Yes   If the patient is a current smoker, are they able to teach back resources for cessation?  -- [non smoker]   Additional teach back comments  Pt's wife has been in contact with [facility] valve clinic and I have enc. her to call for further issues. Pt instructed to have good po fluid intake and avoid caffeine products. Also enc pt to perform daily wts and check b/p and HR each day   Week 2 Call Completed?  Yes          Vicenta Dash RN

## 2019-02-05 NOTE — TELEPHONE ENCOUNTER
Reviewed Dr Huerta's recommendations with patient after reviewing EKG from [facility] showing atrial flutter at 128 bpm.  Per Dr Huerta begin digoxin 250 mcg daily. Rx sent to patient's pharmacy.

## 2019-02-05 NOTE — TELEPHONE ENCOUNTER
Patient's wife returned my call. She notes that her 's hr is 125 this am with bp 115/60 after increasing Toprol to 100 mg bid yesterday. Ongoing plan of care per Dr Huerta.

## 2019-02-08 ENCOUNTER — TELEPHONE (OUTPATIENT)
Dept: CARDIOLOGY | Facility: HOSPITAL | Age: 74
End: 2019-02-08

## 2019-02-08 NOTE — TELEPHONE ENCOUNTER
Spoke with patient's daughter who notes that her dad's heart rate has been fluctuating between 110-120 since starting the digoxin 250 mcg as directed by Dr Huerta. She notes that her dad is feeling fine and has no symptoms of chest pain, dyspnea, dizziness, presyncope.

## 2019-02-12 ENCOUNTER — READMISSION MANAGEMENT (OUTPATIENT)
Dept: CALL CENTER | Facility: HOSPITAL | Age: 74
End: 2019-02-12

## 2019-02-12 NOTE — OUTREACH NOTE
Medical Week 3 Survey      Responses   Facility patient discharged from?  [place]   Does the patient have one of the following disease processes/diagnoses(primary or secondary)?  Other   Week 3 attempt successful?  Yes   Call start time  1106   Call end time  1110   Discharge diagnosis  Atrial flutter with rapid ventricular response    Person spoke with today (if not patient) and relationship  wife   Meds reviewed with patient/caregiver?  Yes   Does the patient have all medications ordered at discharge?  Yes   Is the patient taking all medications as directed (includes completed medication regime)?  Yes   Does the patient have a primary care provider?   Yes   Has the patient kept scheduled appointments due by today?  Yes   What is the Home health agency?   [facility]    Home health comments  Home health continues to come. They visited yesterday.   Psychosocial issues?  No   Did the patient receive a copy of their discharge instructions?  Yes   What is the patient's perception of their health status since discharge?  Improving   Additional teach back comments  Heart is back in a regular rhythm.  Heart has been regular for about 3 days now.   Week 3 Call Completed?  Yes   Graduated  Yes   Did the patient feel the follow up calls were helpful during their recovery period?  Yes   Was the number of calls appropriate?  Yes          Ashwini Corona RN

## 2019-02-19 ENCOUNTER — OFFICE VISIT (OUTPATIENT)
Dept: CARDIOLOGY | Facility: CLINIC | Age: 74
End: 2019-02-19

## 2019-02-19 VITALS
HEART RATE: 65 BPM | BODY MASS INDEX: 27.58 KG/M2 | DIASTOLIC BLOOD PRESSURE: 70 MMHG | HEIGHT: 78 IN | WEIGHT: 238.4 LBS | SYSTOLIC BLOOD PRESSURE: 130 MMHG

## 2019-02-19 DIAGNOSIS — R00.2 PALPITATIONS: Primary | ICD-10-CM

## 2019-02-19 DIAGNOSIS — I48.92 ATRIAL FLUTTER WITH RAPID VENTRICULAR RESPONSE (HCC): ICD-10-CM

## 2019-02-19 DIAGNOSIS — I31.39 PERICARDIAL EFFUSION: ICD-10-CM

## 2019-02-19 PROCEDURE — 93000 ELECTROCARDIOGRAM COMPLETE: CPT | Performed by: PHYSICIAN ASSISTANT

## 2019-02-19 PROCEDURE — 99214 OFFICE O/P EST MOD 30 MIN: CPT | Performed by: PHYSICIAN ASSISTANT

## 2019-02-19 NOTE — PROGRESS NOTES
Monico Roman  1945  PCP: Johnson Arriaga    SUBJECTIVE:   Monico Roman is a 73 y.o. male seen for a follow up visit regarding the following:     Chief Complaint: Follow up for aflutter w/ RVR, pericardial effusion     HPI:    Since last visit the patient's status has been stable. He has noticed his pulse is elevated at times in the 120s and will then return to normal. He is asymptomatic. He had some pedal edema which improved when he started Lasix. No recurrent SOB.     History:       Monico Roman is a 73 y.o. male admitted for atrial flutter directly [facility] last night. He has had a complicated medical history in recent months. 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 He noted that he was in aflutter with controlled rates following the surgery. He had an ECV with Dr. Gardner at [facility] a few weeks ago and was orally loaded with Amio and currently on 200mg daily. He presented to [facility] ED on 1/17 with complaints of tachycardia and given IV metoprolol. ECV was offered, but patient chose to be discharged and follow up with his PCP in [place]. He was also noted to b/l upper extremity DVTs. Eliquis was increased to 10mg BID x 7 days and he only completed 4 days. Yesterday he presented with significant fatigue, dyspnea, and pedal edema. He was given IV lasix in the heart and valve center. The case was discussed with Dr. Huerta who recommended admission for EPS +/- RFA today. He is back in sinus rhythm and reports improvement in SOB and palps.         Cardiac PMH: (Old records have been reviewed and summarized below)    1. Atrial Flutter w/ RVR s/p RFA 1/25/19   2. Pericardial Effusion- present on echo at admission for aflutter/CHF- s/p pericardiocentesis- cytology neg for malignant cells   3. Echo 1/25/19- EF 51-55%; small posterior  effusion post drainage   4.        Current Outpatient Medications:   •  apixaban (ELIQUIS) 5 MG tablet tablet, Take 1 tablet by mouth Every 12 (Twelve) Hours., Disp: 60 tablet, Rfl: 5  •  aspirin 81 MG EC tablet, Take 81 mg by mouth Daily., Disp: , Rfl:   •  metoprolol succinate XL (TOPROL XL) 100 MG 24 hr tablet, Take 1 tablet by mouth 2 (Two) Times a Day., Disp: 60 tablet, Rfl: 5  •  pantoprazole (PROTONIX) 40 MG EC tablet, Take 40 mg by mouth Daily., Disp: , Rfl:   •  pentoxifylline (TRENtal) 400 MG CR tablet, Take 400 mg by mouth 2 (Two) Times a Day., Disp: , Rfl:   •  digoxin (LANOXIN) 250 MCG tablet, Take 1 tablet by mouth Daily., Disp: 30 tablet, Rfl: 11    Past Medical History, Past Surgical History, Family history, Social History, and Medications were all reviewed with the patient today and updated as necessary.       Patient Active Problem List   Diagnosis   • Coronary artery disease   • Myocardial infarction (CMS/HCC)   • CHF (congestive heart failure) (CMS/HCC)   • Borderline diabetes   • Acute deep vein thrombosis (DVT) of upper extremity (CMS/HCC)   • Hypertension   • Stented coronary artery   • COPD (chronic obstructive pulmonary disease) (CMS/HCC)   • GERD (gastroesophageal reflux disease)   • Prostate cancer (CMS/HCC)   • Atrial flutter with rapid ventricular response (CMS/HCC)   • Arthritis   • Bilateral lower extremity edema   • Non-small cell cancer of right lung (CMS/HCC)   • Pericardial effusion     No Known Allergies  Past Medical History:   Diagnosis Date   • Arthritis    • Atrial fibrillation (CMS/HCC)    • Borderline diabetes    • Cancer (CMS/HCC)     small cell carcinoma   • CHF (congestive heart failure) (CMS/HCC)    • Coronary artery disease    • Diabetes mellitus (CMS/HCC)    • GERD (gastroesophageal reflux disease)    • Hypertension    • Myocardial infarction (CMS/HCC) 06/2018   • Prostate cancer (CMS/HCC)    • Stented coronary artery      Past Surgical History:   Procedure Laterality  "Date   • CARDIAC CATHETERIZATION N/A 2019    Procedure: PERICARDIOCENTESIS;  Surgeon: Pierre Hernández MD;  Location:  [facility];  Service: Cardiovascular   • CARDIAC ELECTROPHYSIOLOGY PROCEDURE N/A 2019    Procedure: EP/ABLATION;  Surgeon: Jovanny Huerta MD;  Location:  [facility] EP INVASIVE LOCATION;  Service: Cardiology   • LUNG REMOVAL, PARTIAL  2018    partial Rt lung lobectomy      Family History   Problem Relation Age of Onset   • Hypertension Mother    • Heart disease Mother    • Alzheimer's disease Mother    • Hypertension Father    • Heart disease Father    • Lymphoma Sister    • Leukemia Brother    • Hypertension Daughter    • Hypertension Son    • Heart disease Sister    • Heart attack Sister    • Cervical cancer Sister    • Hypertension Son    • Heart attack Son      Social History     Tobacco Use   • Smoking status: Former Smoker     Packs/day: 1.00     Years: 50.00     Pack years: 50.00     Last attempt to quit: 2018     Years since quittin.5   • Smokeless tobacco: Never Used   Substance Use Topics   • Alcohol use: No     Frequency: Never         PHYSICAL EXAM:    /70 (BP Location: Left arm, Patient Position: Sitting)   Pulse 65   Ht 200.7 cm (79\")   Wt 108 kg (238 lb 6.4 oz)   BMI 26.86 kg/m²        Wt Readings from Last 5 Encounters:   19 108 kg (238 lb 6.4 oz)   19 108 kg (238 lb)   19 106 kg (234 lb 9.6 oz)   19 109 kg (240 lb 4.8 oz)       BP Readings from Last 5 Encounters:   19 130/70   19 138/68   19 109/65   19 136/67   19 132/88       General-Well Nourished, Well developed  Eyes - PERRLA  Neck- supple, No mass  CV- regular rate and rhythm, no MRG, No edema  Lung- clear bilaterally  Abd- soft, +BS  Musc/skel - Norm strength and range of motion  Skin- warm and dry  Neuro - Alert & Oriented x 3, appropriate mood.        Medical problems and test results were reviewed with the patient today. "       EKG: (EKG has been independently visualized by me and summarized below)      ECG 12 Lead  Date/Time: 2/19/2019 11:54 AM  Performed by: Mamie Emerson PA  Authorized by: Mamie Emerson PA   Rhythm: sinus rhythm  Ectopy: atrial premature contractions  BPM: 65    Clinical impression: normal ECG             ASSESSMENT and PLAN    1. Aflutter w/ RVR s/p RFA on 1/25/19. Now having recurrent tachycardia. Will place Zio Patch. Continue Eliquis 5mg BID. On Toprol and Digoxin.     2. Pericardial Effusion- post drainage- cytology negative. No recurrent symptoms. Repeat echo following drainage with only small effusion.     3. Tachycardia- will place Zio Patch.       Return in about 4 weeks (around 3/19/2019) for in [facility] .        RON Spivey-C  Cardiology/Electrophysiology  2/19/2019  11:56 AM

## 2019-02-21 ENCOUNTER — TELEPHONE (OUTPATIENT)
Dept: CARDIOLOGY | Facility: CLINIC | Age: 74
End: 2019-02-21

## 2019-03-07 LAB
MYCOBACTERIUM SPEC CULT: NORMAL
NIGHT BLUE STAIN TISS: NORMAL

## 2019-03-08 ENCOUNTER — OFFICE VISIT (OUTPATIENT)
Dept: CARDIOLOGY | Facility: CLINIC | Age: 74
End: 2019-03-08

## 2019-03-08 VITALS
HEIGHT: 78 IN | WEIGHT: 232.8 LBS | BODY MASS INDEX: 26.94 KG/M2 | DIASTOLIC BLOOD PRESSURE: 80 MMHG | HEART RATE: 68 BPM | SYSTOLIC BLOOD PRESSURE: 130 MMHG

## 2019-03-08 DIAGNOSIS — I48.92 ATRIAL FLUTTER WITH RAPID VENTRICULAR RESPONSE (HCC): ICD-10-CM

## 2019-03-08 DIAGNOSIS — I31.39 PERICARDIAL EFFUSION: Primary | ICD-10-CM

## 2019-03-08 PROCEDURE — 99213 OFFICE O/P EST LOW 20 MIN: CPT | Performed by: INTERNAL MEDICINE

## 2019-03-08 NOTE — PROGRESS NOTES
Monico Roman  1945  PCP: Johnson Arriaga    SUBJECTIVE:   Monico Roman is a 73 y.o. male seen for a follow up visit regarding the following:     Chief Complaint: Follow up for aflutter w/ RVR, pericardial effusion     HPI:    Since last visit the patient's status has been stable. HR has been improved.      History:       Monico Roman is a 73 y.o. male admitted for atrial flutter directly from the [facility] last night. He has had a complicated medical history in recent months. 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 He noted that he was in aflutter with controlled rates following the surgery. He had an ECV with Dr. Gardner at [facility] a few weeks ago and was orally loaded with Amio and currently on 200mg daily. He presented to [facility] ED on 1/17 with complaints of tachycardia and given IV metoprolol. ECV was offered, but patient chose to be discharged and follow up with his PCP in [place]. He was also noted to b/l upper extremity DVTs. Eliquis was increased to 10mg BID x 7 days and he only completed 4 days. Yesterday he presented with significant fatigue, dyspnea, and pedal edema. He was given IV lasix in the heart and valve center. The case was discussed with Dr. Huerta who recommended admission for EPS +/- RFA today. He is back in sinus rhythm and reports improvement in SOB and palps.         Cardiac PMH: (Old records have been reviewed and summarized below)    1. Atrial Flutter w/ RVR s/p RFA 1/25/19   2. Pericardial Effusion- present on echo at admission for aflutter/CHF- s/p pericardiocentesis- cytology neg for malignant cells   3. Echo 1/25/19- EF 51-55%; small posterior effusion post drainage   4.        Current Outpatient Medications:   •  apixaban (ELIQUIS) 5 MG tablet tablet, Take 1 tablet by mouth Every 12 (Twelve) Hours., Disp: 60  tablet, Rfl: 5  •  aspirin 81 MG EC tablet, Take 81 mg by mouth Daily., Disp: , Rfl:   •  metoprolol succinate XL (TOPROL XL) 100 MG 24 hr tablet, Take 1 tablet by mouth 2 (Two) Times a Day., Disp: 60 tablet, Rfl: 5  •  pantoprazole (PROTONIX) 40 MG EC tablet, Take 40 mg by mouth Daily., Disp: , Rfl:   •  pentoxifylline (TRENtal) 400 MG CR tablet, Take 400 mg by mouth 2 (Two) Times a Day., Disp: , Rfl:     Past Medical History, Past Surgical History, Family history, Social History, and Medications were all reviewed with the patient today and updated as necessary.       Patient Active Problem List   Diagnosis   • Coronary artery disease   • Myocardial infarction (CMS/HCC)   • CHF (congestive heart failure) (CMS/HCC)   • Borderline diabetes   • Acute deep vein thrombosis (DVT) of upper extremity (CMS/HCC)   • Hypertension   • Stented coronary artery   • COPD (chronic obstructive pulmonary disease) (CMS/HCC)   • GERD (gastroesophageal reflux disease)   • Prostate cancer (CMS/HCC)   • Atrial flutter with rapid ventricular response (CMS/HCC)   • Arthritis   • Bilateral lower extremity edema   • Non-small cell cancer of right lung (CMS/HCC)   • Pericardial effusion     No Known Allergies  Past Medical History:   Diagnosis Date   • Arthritis    • Atrial fibrillation (CMS/HCC)    • Borderline diabetes    • Cancer (CMS/HCC)     small cell carcinoma   • CHF (congestive heart failure) (CMS/HCC)    • Coronary artery disease    • Diabetes mellitus (CMS/HCC)    • GERD (gastroesophageal reflux disease)    • Hypertension    • Myocardial infarction (CMS/HCC) 06/2018   • Prostate cancer (CMS/HCC)    • Stented coronary artery      Past Surgical History:   Procedure Laterality Date   • CARDIAC CATHETERIZATION N/A 1/24/2019    Procedure: PERICARDIOCENTESIS;  Surgeon: Pierre Hernández MD;  Location: [facility] INVASIVE LOCATION;  Service: Cardiovascular   • CARDIAC ELECTROPHYSIOLOGY PROCEDURE N/A 1/25/2019    Procedure: EP/ABLATION;   "Surgeon: Jovanny Huerta MD;  Location: [facility] INVASIVE LOCATION;  Service: Cardiology   • LUNG REMOVAL, PARTIAL  2018    partial Rt lung lobectomy      Family History   Problem Relation Age of Onset   • Hypertension Mother    • Heart disease Mother    • Alzheimer's disease Mother    • Hypertension Father    • Heart disease Father    • Lymphoma Sister    • Leukemia Brother    • Hypertension Daughter    • Hypertension Son    • Heart disease Sister    • Heart attack Sister    • Cervical cancer Sister    • Hypertension Son    • Heart attack Son      Social History     Tobacco Use   • Smoking status: Former Smoker     Packs/day: 1.00     Years: 50.00     Pack years: 50.00     Last attempt to quit: 2018     Years since quittin.6   • Smokeless tobacco: Never Used   Substance Use Topics   • Alcohol use: No     Frequency: Never         PHYSICAL EXAM:    /80 (BP Location: Left arm, Patient Position: Sitting)   Pulse 68   Ht 200.7 cm (79\")   Wt 106 kg (232 lb 12.8 oz)   BMI 26.23 kg/m²        Wt Readings from Last 5 Encounters:   19 106 kg (232 lb 12.8 oz)   19 108 kg (238 lb 6.4 oz)   19 108 kg (238 lb)   19 106 kg (234 lb 9.6 oz)   19 109 kg (240 lb 4.8 oz)       BP Readings from Last 5 Encounters:   19 130/80   19 130/70   19 138/68   19 109/65   19 136/67       General-Well Nourished, Well developed  Eyes - PERRLA  Neck- supple, No mass  CV- regular rate and rhythm, no MRG, No edema  Lung- clear bilaterally  Abd- soft, +BS  Musc/skel - Norm strength and range of motion  Skin- warm and dry  Neuro - Alert & Oriented x 3, appropriate mood.        Medical problems and test results were reviewed with the patient today.       EKG: (EKG has been independently visualized by me and summarized below)    Procedures     ASSESSMENT and PLAN    1. Aflutter w/ RVR s/p RFA on 19. Continue Eliquis 5mg BID. On Toprol and Digoxin.     2. Pericardial " Effusion- post drainage- cytology negative. No recurrent symptoms. Repeat echo following drainage with only small effusion.     3. Tachycardia- Zio Patch results are pending       Return in about 3 months (around 6/8/2019).      Jovanny Huerta M.D., FRACHEAL, F.H.R.S.  Cardiology/Electrophysiology  3/8/2019  1:30 PM

## 2019-03-14 ENCOUNTER — TELEPHONE (OUTPATIENT)
Dept: CARDIOLOGY | Facility: CLINIC | Age: 74
End: 2019-03-14

## 2019-03-14 NOTE — TELEPHONE ENCOUNTER
Pt's wife called, he went out of rhythm last night, HR 130s, BP 87/56, feeling ok.  After going over his meds she let me know that he was no longer taking the Digoxin because she thought it lowered his BP.  I am having her go ahead and give him the Dig and call me back in a few hours to check HR/BP to decide when to give Toprol.

## 2019-03-15 ENCOUNTER — TELEPHONE (OUTPATIENT)
Dept: CARDIOLOGY | Facility: CLINIC | Age: 74
End: 2019-03-15

## 2019-03-15 NOTE — TELEPHONE ENCOUNTER
Per Deanna pt to take Digoxin 250mcg 2 tablets now. I advised pt's wife Rae to call us back in a few hrs with HR and BP. She does not want to give 2 tablets she will give 1 tablet and call us.

## 2019-03-15 NOTE — TELEPHONE ENCOUNTER
Wife Rae calling to let us know he took his digoxin and metoprolol and his b/p is 100/60 and his HR is 130 still. Dr. Huerta is there anything else she needs to do to get his rate down?  Current meds-  Eliquis 5mg 1 bid  Digoxin 250mcg 1 daily just restarted yesterday per AM see previous note.  Metoprolol XL 100mg 1 BID

## 2019-03-18 ENCOUNTER — TELEPHONE (OUTPATIENT)
Dept: CARDIOLOGY | Facility: CLINIC | Age: 74
End: 2019-03-18

## 2019-07-12 ENCOUNTER — OFFICE VISIT (OUTPATIENT)
Dept: CARDIOLOGY | Facility: CLINIC | Age: 74
End: 2019-07-12

## 2019-07-12 VITALS
WEIGHT: 224 LBS | SYSTOLIC BLOOD PRESSURE: 130 MMHG | HEIGHT: 76 IN | HEART RATE: 75 BPM | DIASTOLIC BLOOD PRESSURE: 72 MMHG | BODY MASS INDEX: 27.28 KG/M2

## 2019-07-12 DIAGNOSIS — I31.39 PERICARDIAL EFFUSION: ICD-10-CM

## 2019-07-12 DIAGNOSIS — I48.92 ATRIAL FLUTTER WITH RAPID VENTRICULAR RESPONSE (HCC): Primary | ICD-10-CM

## 2019-07-12 DIAGNOSIS — I48.0 PAROXYSMAL ATRIAL FIBRILLATION (HCC): ICD-10-CM

## 2019-07-12 PROCEDURE — 99214 OFFICE O/P EST MOD 30 MIN: CPT | Performed by: INTERNAL MEDICINE

## 2019-07-12 NOTE — PROGRESS NOTES
Monico Roman  1945  PCP: Johnson Arriaga    SUBJECTIVE:   Monico Roman is a 73 y.o. male seen for a follow up visit regarding the following:     Chief Complaint: Follow up for aflutter w/ RVR, pericardial effusion     HPI:    Since last visit the patient's status has been stable. HR has been improved.  Rare Palp. Feeling much better overall.     History:       Monico Roman is a 73 y.o. male admitted for atrial flutter directly from [facility] last night. He has had a complicated medical history in recent months. 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 He noted that he was in aflutter with controlled rates following the surgery. He had an ECV with Dr. Gardner at [facility] a few weeks ago and was orally loaded with Amio and currently on 200mg daily. He presented to [facility] ED on 1/17 with complaints of tachycardia and given IV metoprolol. ECV was offered, but patient chose to be discharged and follow up with his PCP in [place]. He was also noted to b/l upper extremity DVTs. Eliquis was increased to 10mg BID x 7 days and he only completed 4 days. Yesterday he presented with significant fatigue, dyspnea, and pedal edema. He was given IV lasix in the heart and valve center. The case was discussed with Dr. Huerta who recommended admission for EPS +/- RFA today. He is back in sinus rhythm and reports improvement in SOB and palps.         Cardiac PMH: (Old records have been reviewed and summarized below)    1. Atrial Flutter w/ RVR s/p RFA 1/25/19   2. Pericardial Effusion- present on echo at admission for aflutter/CHF- s/p pericardiocentesis- cytology neg for malignant cells   3. Echo 1/25/19- EF 51-55%; small posterior effusion post drainage   4. Monitor - Feb 2019 - Average HR: 85. Min HR: 48. Max HR: 182. - 35% A. Fib burden      Current  Outpatient Medications:   •  apixaban (ELIQUIS) 5 MG tablet tablet, Take 1 tablet by mouth Every 12 (Twelve) Hours., Disp: 60 tablet, Rfl: 5  •  aspirin 81 MG EC tablet, Take 81 mg by mouth Daily., Disp: , Rfl:   •  metoprolol succinate XL (TOPROL XL) 100 MG 24 hr tablet, Take 1 tablet by mouth 2 (Two) Times a Day., Disp: 60 tablet, Rfl: 5  •  pantoprazole (PROTONIX) 40 MG EC tablet, Take 40 mg by mouth Daily., Disp: , Rfl:   •  pentoxifylline (TRENtal) 400 MG CR tablet, Take 400 mg by mouth 2 (Two) Times a Day., Disp: , Rfl:     Past Medical History, Past Surgical History, Family history, Social History, and Medications were all reviewed with the patient today and updated as necessary.       Patient Active Problem List   Diagnosis   • Coronary artery disease   • Myocardial infarction (CMS/HCC)   • CHF (congestive heart failure) (CMS/HCC)   • Borderline diabetes   • Acute deep vein thrombosis (DVT) of upper extremity (CMS/HCC)   • Hypertension   • Stented coronary artery   • COPD (chronic obstructive pulmonary disease) (CMS/HCC)   • GERD (gastroesophageal reflux disease)   • Prostate cancer (CMS/HCC)   • Atrial flutter with rapid ventricular response (CMS/HCC)   • Arthritis   • Bilateral lower extremity edema   • Non-small cell cancer of right lung (CMS/HCC)   • Pericardial effusion   • Paroxysmal atrial fibrillation (CMS/HCC)     No Known Allergies  Past Medical History:   Diagnosis Date   • Arthritis    • Atrial fibrillation (CMS/HCC)    • Borderline diabetes    • Cancer (CMS/HCC)     small cell carcinoma   • CHF (congestive heart failure) (CMS/HCC)    • Coronary artery disease    • Diabetes mellitus (CMS/HCC)    • GERD (gastroesophageal reflux disease)    • Hypertension    • Myocardial infarction (CMS/HCC) 06/2018   • Prostate cancer (CMS/HCC)    • Stented coronary artery      Past Surgical History:   Procedure Laterality Date   • CARDIAC CATHETERIZATION N/A 1/24/2019    Procedure: PERICARDIOCENTESIS;  Surgeon:  "Pierre Hernández MD;  Location:  [facility];  Service: Cardiovascular   • CARDIAC ELECTROPHYSIOLOGY PROCEDURE N/A 2019    Procedure: EP/ABLATION;  Surgeon: Jovanny Huerta MD;  Location:  [facility] EP INVASIVE LOCATION;  Service: Cardiology   • LUNG REMOVAL, PARTIAL  2018    partial Rt lung lobectomy      Family History   Problem Relation Age of Onset   • Hypertension Mother    • Heart disease Mother    • Alzheimer's disease Mother    • Hypertension Father    • Heart disease Father    • Lymphoma Sister    • Leukemia Brother    • Hypertension Daughter    • Hypertension Son    • Heart disease Sister    • Heart attack Sister    • Cervical cancer Sister    • Hypertension Son    • Heart attack Son      Social History     Tobacco Use   • Smoking status: Former Smoker     Packs/day: 1.00     Years: 50.00     Pack years: 50.00     Last attempt to quit: 2018     Years since quittin.9   • Smokeless tobacco: Never Used   Substance Use Topics   • Alcohol use: No     Frequency: Never         PHYSICAL EXAM:    /72 (BP Location: Left arm, Patient Position: Sitting)   Pulse 75   Ht 193 cm (76\")   Wt 102 kg (224 lb)   BMI 27.27 kg/m²        Wt Readings from Last 5 Encounters:   19 102 kg (224 lb)   19 106 kg (232 lb 12.8 oz)   19 108 kg (238 lb 6.4 oz)   19 108 kg (238 lb)   19 106 kg (234 lb 9.6 oz)       BP Readings from Last 5 Encounters:   19 130/72   19 130/80   19 130/70   19 138/68   19 109/65       General-Well Nourished, Well developed  Eyes - PERRLA  Neck- supple, No mass  CV- regular rate and rhythm, no MRG, No edema  Lung- clear bilaterally  Abd- soft, +BS  Musc/skel - Norm strength and range of motion  Skin- warm and dry  Neuro - Alert & Oriented x 3, appropriate mood.        Medical problems and test results were reviewed with the patient today.       EKG: (EKG has been independently visualized by me and summarized " below)    Procedures     ASSESSMENT and PLAN    1. Aflutter w/ RVR s/p RFA on 1/25/19. Continue Eliquis 5mg BID. On Toprol and Digoxin.     2. Pericardial Effusion- post drainage- cytology negative. No recurrent symptoms. Repeat echo following drainage with only small effusion.     3. Tachycardia- Occasional palp    4. A. Fib - 35% burden by ECHO in Feb 2019 - Continue meds.       Return in about 6 months (around 1/12/2020).      Jovanny Huerta M.D., F.SHAY.C.C, F.H.R.S.  Cardiology/Electrophysiology  7/12/2019  11:59 AM

## 2020-02-03 RX ORDER — DIGOXIN 250 MCG
250 TABLET ORAL DAILY
Qty: 30 TABLET | Refills: 5 | Status: SHIPPED | OUTPATIENT
Start: 2020-02-03 | End: 2021-05-14

## 2020-07-21 ENCOUNTER — TELEPHONE (OUTPATIENT)
Dept: CARDIOLOGY | Facility: CLINIC | Age: 75
End: 2020-07-21

## 2020-07-21 NOTE — TELEPHONE ENCOUNTER
Patients wife called reporting that Mr. Roman's PCP wants to start him on lisinopril 10mg for hypertension. BPs usually run 134/73, 132/70. The wife is concerned and wanted to know if you thought this was necessary. I also transferred her to scheduling to make a f/u appt.

## 2021-05-14 ENCOUNTER — OFFICE VISIT (OUTPATIENT)
Dept: CARDIOLOGY | Facility: CLINIC | Age: 76
End: 2021-05-14

## 2021-05-14 VITALS
OXYGEN SATURATION: 93 % | HEIGHT: 78 IN | HEART RATE: 59 BPM | BODY MASS INDEX: 25.45 KG/M2 | DIASTOLIC BLOOD PRESSURE: 78 MMHG | WEIGHT: 220 LBS | SYSTOLIC BLOOD PRESSURE: 140 MMHG

## 2021-05-14 DIAGNOSIS — I48.0 PAROXYSMAL ATRIAL FIBRILLATION (HCC): ICD-10-CM

## 2021-05-14 DIAGNOSIS — I10 ESSENTIAL HYPERTENSION: ICD-10-CM

## 2021-05-14 DIAGNOSIS — I48.92 ATRIAL FLUTTER WITH RAPID VENTRICULAR RESPONSE (HCC): Primary | ICD-10-CM

## 2021-05-14 PROCEDURE — 93000 ELECTROCARDIOGRAM COMPLETE: CPT | Performed by: INTERNAL MEDICINE

## 2021-05-14 PROCEDURE — 99214 OFFICE O/P EST MOD 30 MIN: CPT | Performed by: INTERNAL MEDICINE

## 2021-05-14 NOTE — PROGRESS NOTES
Monico Roman  1945  PCP: Johnson Arriaga    SUBJECTIVE:   Monico Roman is a 75 y.o. male seen for a follow up visit regarding the following:     Chief Complaint: Follow up for aflutter w/ RVR, pericardial effusion     HPI:    Since last visit the patient's status has been stable.  He has not presented back for follow-up in almost 2 years. No A. Fib events, overall is doing well.     History:       Monico Roman is a 75 y.o. male admitted for atrial flutter directly from the [facility] Center last night. He has had a complicated medical history in recent months. 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 He noted that he was in aflutter with controlled rates following the surgery. He had an ECV with Dr. Gardner at [facility] a few weeks ago and was orally loaded with Amio and currently on 200mg daily. He presented to [facility] ED on 1/17 with complaints of tachycardia and given IV metoprolol. ECV was offered, but patient chose to be discharged and follow up with his PCP in [place]. He was also noted to b/l upper extremity DVTs. Eliquis was increased to 10mg BID x 7 days and he only completed 4 days. Yesterday he presented with significant fatigue, dyspnea, and pedal edema. He was given IV lasix in the heart and valve center.          Cardiac PMH: (Old records have been reviewed and summarized below)    1. Atrial Flutter w/ RVR s/p RFA 1/25/19   2. Pericardial Effusion- present on echo at admission for aflutter/CHF- s/p pericardiocentesis- cytology neg for malignant cells   3. Echo 1/25/19- EF 51-55%; small posterior effusion post drainage   4. Monitor - Feb 2019 - Average HR: 85. Min HR: 48. Max HR: 182. - 35% A. Fib burden      Current Outpatient Medications:   •  apixaban (ELIQUIS) 5 MG tablet tablet, Take 1 tablet by mouth Every 12 (Twelve) Hours., Disp: 60  tablet, Rfl: 5  •  aspirin 81 MG EC tablet, Take 81 mg by mouth Daily., Disp: , Rfl:   •  metoprolol succinate XL (TOPROL XL) 100 MG 24 hr tablet, Take 1 tablet by mouth 2 (Two) Times a Day., Disp: 60 tablet, Rfl: 5  •  pantoprazole (PROTONIX) 40 MG EC tablet, Take 40 mg by mouth Daily., Disp: , Rfl:   •  pentoxifylline (TRENtal) 400 MG CR tablet, Take 400 mg by mouth 2 (Two) Times a Day., Disp: , Rfl:     Past Medical History, Past Surgical History, Family history, Social History, and Medications were all reviewed with the patient today and updated as necessary.       Patient Active Problem List   Diagnosis   • Coronary artery disease   • Myocardial infarction (CMS/HCC)   • CHF (congestive heart failure) (CMS/HCC)   • Borderline diabetes   • Acute deep vein thrombosis (DVT) of upper extremity (CMS/HCC)   • Hypertension   • Stented coronary artery   • COPD (chronic obstructive pulmonary disease) (CMS/HCC)   • GERD (gastroesophageal reflux disease)   • Prostate cancer (CMS/HCC)   • Atrial flutter with rapid ventricular response (CMS/HCC)   • Arthritis   • Bilateral lower extremity edema   • Non-small cell cancer of right lung (CMS/HCC)   • Pericardial effusion   • Paroxysmal atrial fibrillation (CMS/HCC)     No Known Allergies  Past Medical History:   Diagnosis Date   • Arthritis    • Atrial fibrillation (CMS/HCC)    • Borderline diabetes    • Cancer (CMS/HCC)     small cell carcinoma   • CHF (congestive heart failure) (CMS/HCC)    • Coronary artery disease    • Diabetes mellitus (CMS/HCC)    • GERD (gastroesophageal reflux disease)    • Hypertension    • Myocardial infarction (CMS/HCC) 06/2018   • Prostate cancer (CMS/HCC)    • Stented coronary artery      Past Surgical History:   Procedure Laterality Date   • CARDIAC CATHETERIZATION N/A 1/24/2019    Procedure: PERICARDIOCENTESIS;  Surgeon: Pierre Hernández MD;  Location: [facility] of [facility] CATH INVASIVE LOCATION;  Service: Cardiovascular   • CARDIAC ELECTROPHYSIOLOGY PROCEDURE  "N/A 2019    Procedure: EP/ABLATION;  Surgeon: Jovanny Huerta MD;  Location:  [facility] EP INVASIVE LOCATION;  Service: Cardiology   • LUNG REMOVAL, PARTIAL  2018    partial Rt lung lobectomy      Family History   Problem Relation Age of Onset   • Hypertension Mother    • Heart disease Mother    • Alzheimer's disease Mother    • Hypertension Father    • Heart disease Father    • Lymphoma Sister    • Leukemia Brother    • Hypertension Daughter    • Hypertension Son    • Heart disease Sister    • Heart attack Sister    • Cervical cancer Sister    • Hypertension Son    • Heart attack Son      Social History     Tobacco Use   • Smoking status: Former Smoker     Packs/day: 1.00     Years: 50.00     Pack years: 50.00     Quit date: 2018     Years since quittin.7   • Smokeless tobacco: Never Used   Substance Use Topics   • Alcohol use: No         PHYSICAL EXAM:    /78 (BP Location: Left arm, Patient Position: Sitting)   Pulse 59   Ht 198.1 cm (78\")   Wt 99.8 kg (220 lb)   SpO2 93%   BMI 25.42 kg/m²        Wt Readings from Last 5 Encounters:   21 99.8 kg (220 lb)   19 102 kg (224 lb)   19 106 kg (232 lb 12.8 oz)   19 108 kg (238 lb 6.4 oz)   19 108 kg (238 lb)       BP Readings from Last 5 Encounters:   21 140/78   19 130/72   19 130/80   19 130/70   19 138/68       General-Well Nourished, Well developed  Eyes - PERRLA  Neck- supple, No mass  CV- regular rate and rhythm, no MRG, No edema  Lung- clear bilaterally  Abd- soft, +BS  Musc/skel - Norm strength and range of motion  Skin- warm and dry  Neuro - Alert & Oriented x 3, appropriate mood.        Medical problems and test results were reviewed with the patient today.       EKG: (EKG has been independently visualized by me and summarized below)      ECG 12 Lead    Date/Time: 2021 1:20 PM  Performed by: Jovanny Huerta MD  Authorized by: Jovanny Huerta MD   Comparison: compared " with previous ECG   Similar to previous ECG  Rhythm: sinus rhythm  Rate: normal  QRS axis: normal  Other findings: non-specific ST-T wave changes    Clinical impression: abnormal EKG             ASSESSMENT and PLAN    1. Aflutter w/ RVR s/p RFA on 1/25/19. Continue Eliquis 5mg BID. On Toprol and Digoxin.     2. Pericardial Effusion- post drainage- cytology negative. No recurrent symptoms. Repeat echo following drainage with only small effusion. Will recheck an ECHO in the future    3. Tachycardia- Occasional palp    4. A. Fib - 35% burden by monitor in Feb 2019 - Continue meds. No current events that he is aware.     5. HTN - Stable on current meds.    6. Use of Digoxin - PCP is check labs - EKG is suggestive of high digoxin levels. Will Stop today      Return in about 6 months (around 11/14/2021).      Jovanny Huerta M.D., SAEEDC, F.H.R.S.  Cardiology/Electrophysiology  5/14/2021  13:21 EDT

## 2021-05-20 ENCOUNTER — TELEPHONE (OUTPATIENT)
Dept: CARDIOLOGY | Facility: CLINIC | Age: 76
End: 2021-05-20

## 2021-05-20 NOTE — TELEPHONE ENCOUNTER
Patient saw you in clinic on 5/14/21. You stopped Digoxin d/t possible toxicity. He would like to know if it could be restarted at a lower dose because he has not been feeling well since it was stopped?

## 2021-05-21 NOTE — TELEPHONE ENCOUNTER
Patient's wife notified and aware. RX for Digoxin 0.125 mcg daily sent to [facility] pharmacy in [place].

## 2021-05-24 RX ORDER — DIGOXIN 125 MCG
125 TABLET ORAL DAILY
Qty: 90 TABLET | Refills: 3 | Status: SHIPPED | OUTPATIENT
Start: 2021-05-24 | End: 2021-12-10 | Stop reason: ALTCHOICE

## 2021-12-10 ENCOUNTER — OFFICE VISIT (OUTPATIENT)
Dept: CARDIOLOGY | Facility: CLINIC | Age: 76
End: 2021-12-10

## 2021-12-10 VITALS
HEART RATE: 47 BPM | HEIGHT: 78 IN | BODY MASS INDEX: 23.95 KG/M2 | SYSTOLIC BLOOD PRESSURE: 138 MMHG | OXYGEN SATURATION: 99 % | WEIGHT: 207 LBS | DIASTOLIC BLOOD PRESSURE: 60 MMHG

## 2021-12-10 DIAGNOSIS — I31.39 PERICARDIAL EFFUSION: ICD-10-CM

## 2021-12-10 DIAGNOSIS — I48.0 PAROXYSMAL ATRIAL FIBRILLATION (HCC): Primary | ICD-10-CM

## 2021-12-10 DIAGNOSIS — I48.92 ATRIAL FLUTTER WITH RAPID VENTRICULAR RESPONSE (HCC): ICD-10-CM

## 2021-12-10 DIAGNOSIS — I10 PRIMARY HYPERTENSION: ICD-10-CM

## 2021-12-10 PROCEDURE — 99213 OFFICE O/P EST LOW 20 MIN: CPT | Performed by: STUDENT IN AN ORGANIZED HEALTH CARE EDUCATION/TRAINING PROGRAM

## 2021-12-10 PROCEDURE — 93000 ELECTROCARDIOGRAM COMPLETE: CPT | Performed by: NURSE PRACTITIONER

## 2021-12-10 RX ORDER — HYDROXYCHLOROQUINE SULFATE 200 MG/1
200 TABLET, FILM COATED ORAL DAILY
COMMUNITY
Start: 2021-11-08

## 2021-12-10 RX ORDER — DONEPEZIL HYDROCHLORIDE 10 MG/1
10 TABLET, FILM COATED ORAL DAILY
COMMUNITY
Start: 2021-12-02

## 2021-12-10 RX ORDER — FERROUS SULFATE 325(65) MG
325 TABLET ORAL
COMMUNITY

## 2021-12-10 NOTE — PROGRESS NOTES
Monico Roman  1945  [phone]    12/10/2021    [facility] CARDIOLOGY     Referring Provider: No ref. provider found     Johnson Arriaga  [address] [place] [id number]    Chief Complaint   Patient presents with   • Atrial Flutter       Cardiac PMH: (Old records have been reviewed and summarized below)    1. Atrial Flutter w/ RVR s/p RFA 1/25/19   2. Pericardial Effusion- present on echo at admission for aflutter/CHF- s/p pericardiocentesis- cytology neg for malignant cells   3. Echo 1/25/19- EF 51-55%; small posterior effusion post drainage   4. Monitor - Feb 2019 - Average HR: 85. Min HR: 48. Max HR: 182. - 35% A. Fib burden    Allergies  No Known Allergies    Current Medications    Current Outpatient Medications:   •  apixaban (ELIQUIS) 5 MG tablet tablet, Take 1 tablet by mouth Every 12 (Twelve) Hours., Disp: 60 tablet, Rfl: 5  •  aspirin 81 MG EC tablet, Take 81 mg by mouth Daily., Disp: , Rfl:   •  donepezil (ARICEPT) 10 MG tablet, Daily., Disp: , Rfl:   •  ferrous sulfate 325 (65 FE) MG tablet, Take 325 mg by mouth Daily With Breakfast., Disp: , Rfl:   •  hydroxychloroquine (PLAQUENIL) 200 MG tablet, Daily., Disp: , Rfl:   •  metFORMIN (GLUCOPHAGE) 500 MG tablet, Take 500 mg by mouth 2 (Two) Times a Day With Meals., Disp: , Rfl:   •  metoprolol succinate XL (TOPROL XL) 100 MG 24 hr tablet, Take 1 tablet by mouth 2 (Two) Times a Day., Disp: 60 tablet, Rfl: 5  •  pantoprazole (PROTONIX) 40 MG EC tablet, Take 40 mg by mouth Daily., Disp: , Rfl:   •  pentoxifylline (TRENtal) 400 MG CR tablet, Take 400 mg by mouth 2 (Two) Times a Day., Disp: , Rfl:     History of Present Illness     Pt presents for follow up of AF/AFL. Since we last saw the pt, pt denies any AF episodes, SOB, CP, LH, and dizziness. Denies any hospitalizations, ER visits, bleeding issues on Eliquis and ASA, or TIA/CVA symptoms. Patient has hx of pericardial effusion has not had repeat echo. BP well controlled. Overall feels  "well.    ROS:  General:  Denies fatigue, weight gain or loss  Cardiovascular:  Denies CP, PND, syncope, near syncope, edema or palpitations.  Pulmonary:  Denies MCKENNA, cough, or wheezing      Vitals:    12/10/21 1027   BP: 138/60   BP Location: Left arm   Patient Position: Sitting   Pulse: (!) 47   SpO2: 99%   Weight: 93.9 kg (207 lb)   Height: 198.1 cm (78\")     Body mass index is 23.92 kg/m².  PE:  General: NAD  Neck: no JVD, no carotid bruits, no TM  Heart bradycardic, NL S1, S2, S4 present, no rubs, murmurs  Lungs: CTA, no wheezes, rhonchi, or rales  Abd: soft, non-tender, NL BS  Ext: No musculoskeletal deformities, no edema, cyanosis, or clubbing  Psych: normal mood and affect    Diagnostic Data:        ECG 12 Lead    Date/Time: 12/10/2021 10:46 AM  Performed by: Samanta Tipton APRN  Authorized by: Samanta Tipton APRN   Rhythm: sinus bradycardia  Rate: bradycardic  BPM: 46              1. Paroxysmal atrial fibrillation (HCC)    2. Atrial flutter with rapid ventricular response (HCC)    3. Pericardial effusion    4. Primary hypertension      Plan:    1. AF/AFL  -On Toprol , no recurrence, continue.   -Continue Eliquis 5mg BID.       2. Pericardial Effusion- post drainage- cytology negative. No recurrent symptoms.  Will recheck an ECHO.     3. Tachycardia  - no recurrence.      5. HTN - Stable on current meds.Continue medications      F/up in 6 months    Electronically signed by KEHINDE Noe, 12/10/21, 10:29 AM EST.    "

## 2022-01-28 ENCOUNTER — APPOINTMENT (OUTPATIENT)
Dept: CARDIOLOGY | Facility: HOSPITAL | Age: 77
End: 2022-01-28

## 2022-08-05 ENCOUNTER — OFFICE VISIT (OUTPATIENT)
Dept: CARDIOLOGY | Facility: CLINIC | Age: 77
End: 2022-08-05

## 2022-08-05 VITALS
WEIGHT: 201.6 LBS | OXYGEN SATURATION: 94 % | DIASTOLIC BLOOD PRESSURE: 70 MMHG | HEART RATE: 58 BPM | HEIGHT: 78 IN | BODY MASS INDEX: 23.32 KG/M2 | SYSTOLIC BLOOD PRESSURE: 148 MMHG

## 2022-08-05 DIAGNOSIS — I31.39 PERICARDIAL EFFUSION: ICD-10-CM

## 2022-08-05 DIAGNOSIS — I48.0 PAROXYSMAL ATRIAL FIBRILLATION: Primary | ICD-10-CM

## 2022-08-05 PROCEDURE — 99213 OFFICE O/P EST LOW 20 MIN: CPT | Performed by: NURSE PRACTITIONER

## 2022-08-05 PROCEDURE — 93000 ELECTROCARDIOGRAM COMPLETE: CPT | Performed by: NURSE PRACTITIONER

## 2022-08-05 RX ORDER — MEMANTINE HYDROCHLORIDE 5 MG/1
1 TABLET ORAL DAILY
COMMUNITY
Start: 2022-05-27

## 2022-08-05 RX ORDER — DIGOXIN 125 MCG
1 TABLET ORAL DAILY
COMMUNITY
Start: 2022-05-23

## 2022-08-05 NOTE — PROGRESS NOTES
Cardiac Electrophysiology Outpatient Note  [facility]    Office Visit     Monico Roman  [id number]  08/05/2022    Primary Care Physician: Johnson Arriaga    Referred By: No ref. provider found    CC:   Chief Complaint   Patient presents with   • Atrial Fibrillation       Cardiac PMH: (Old records have been reviewed and summarized below)     1. Atrial Flutter w/ RVR s/p RFA 1/25/19   2. Pericardial Effusion- present on echo at admission for aflutter/CHF- s/p pericardiocentesis- cytology neg for malignant cells   3. Echo 1/25/19- EF 51-55%; small posterior effusion post drainage   4. Monitor - Feb 2019 - Average HR: 85. Min HR: 48. Max HR: 182. - 35% A. Fib burden    History of Present Illness:   Monico Roman is a 76 y.o. male who presents to the electrophysiology clinic for follow up of atrial fibrillation. Since his last office visit he was diagnosed with lung cancer, which he had a history of several years ago.  He was supposed to have surgery for this at  several weeks ago that reportedly was postponed due to abnormal stress test and they have been waiting for an appointment with a cardiologist but it sounds like this has not been set up yet.  Encouraged them to reach out to  to ensure that he has an upcoming appointment.  He denies chest pain, palpitations, lower extremity edema, syncope, or presyncope, dizziness or lightheadedness.  He does have some shortness of breath which is what caused him to seek care leading to the lung cancer diagnosis.  He has been off of his Eliquis for the last several weeks  Because of the scheduled surgery, which was then postponed.    Past Surgical History:   Procedure Laterality Date   • CARDIAC CATHETERIZATION N/A 1/24/2019    Procedure: PERICARDIOCENTESIS;  Surgeon: Pierre Hernández MD;  Location: [facility] INVASIVE LOCATION;  Service: Cardiovascular   • CARDIAC ELECTROPHYSIOLOGY PROCEDURE N/A 1/25/2019    Procedure:  EP/ABLATION;  Surgeon: Jovanny Huerta MD;  Location: [facility] EP INVASIVE LOCATION;  Service: Cardiology   • LUNG REMOVAL, PARTIAL  2018    partial Rt lung lobectomy        Family History   Problem Relation Age of Onset   • Hypertension Mother    • Heart disease Mother    • Alzheimer's disease Mother    • Hypertension Father    • Heart disease Father    • Lymphoma Sister    • Leukemia Brother    • Hypertension Daughter    • Hypertension Son    • Heart disease Sister    • Heart attack Sister    • Cervical cancer Sister    • Hypertension Son    • Heart attack Son        Social History     Socioeconomic History   • Marital status:    Tobacco Use   • Smoking status: Former Smoker     Packs/day: 1.00     Years: 50.00     Pack years: 50.00     Quit date: 2018     Years since quittin.0   • Smokeless tobacco: Never Used   Vaping Use   • Vaping Use: Never used   Substance and Sexual Activity   • Alcohol use: No   • Drug use: No   • Sexual activity: Defer         Current Outpatient Medications:   •  apixaban (ELIQUIS) 5 MG tablet tablet, Take 1 tablet by mouth Every 12 (Twelve) Hours., Disp: 60 tablet, Rfl: 5  •  aspirin 81 MG EC tablet, Take 81 mg by mouth Daily., Disp: , Rfl:   •  Cholecalciferol (VITAMIN D3 PO), Take 1 capsule by mouth Daily., Disp: , Rfl:   •  Cyanocobalamin (B-12 PO), Take 1 capsule by mouth Daily., Disp: , Rfl:   •  digoxin (LANOXIN) 125 MCG tablet, Take 1 tablet by mouth Daily., Disp: , Rfl:   •  donepezil (ARICEPT) 10 MG tablet, 10 mg Daily., Disp: , Rfl:   •  ferrous sulfate 325 (65 FE) MG tablet, Take 325 mg by mouth Daily With Breakfast., Disp: , Rfl:   •  hydroxychloroquine (PLAQUENIL) 200 MG tablet, Take 200 mg by mouth Daily., Disp: , Rfl:   •  memantine (NAMENDA) 5 MG tablet, Take 1 tablet by mouth Daily., Disp: , Rfl:   •  metFORMIN (GLUCOPHAGE) 500 MG tablet, Take 500 mg by mouth 2 (Two) Times a Day With Meals., Disp: , Rfl:   •  metoprolol succinate XL (TOPROL XL) 100 MG  "24 hr tablet, Take 1 tablet by mouth 2 (Two) Times a Day., Disp: 60 tablet, Rfl: 5  •  pantoprazole (PROTONIX) 40 MG EC tablet, Take 40 mg by mouth Daily., Disp: , Rfl:   •  pentoxifylline (TRENtal) 400 MG CR tablet, Take 400 mg by mouth 2 (Two) Times a Day., Disp: , Rfl:     Allergies:   No Known Allergies    Review of Systems:  Review of Systems   Constitutional: Negative for malaise/fatigue.   Cardiovascular: Positive for dyspnea on exertion. Negative for chest pain, irregular heartbeat, leg swelling, near-syncope, orthopnea, palpitations, paroxysmal nocturnal dyspnea and syncope.   Respiratory: Positive for shortness of breath.    Hematologic/Lymphatic: Negative for bleeding problem.   Musculoskeletal: Positive for arthritis.   Neurological: Negative for dizziness, headaches and light-headedness.        Vital Signs: Blood pressure 148/70, pulse 58, height 200.7 cm (79\"), weight 91.4 kg (201 lb 9.6 oz), SpO2 94 %.    Physical Exam  Vitals reviewed.   Constitutional:       Appearance: Normal appearance.   Cardiovascular:      Rate and Rhythm: Normal rate.      Pulses: Normal pulses.      Heart sounds: Normal heart sounds.   Pulmonary:      Effort: Pulmonary effort is normal.      Breath sounds: Normal breath sounds.   Musculoskeletal:      Right lower leg: No edema.      Left lower leg: No edema.   Neurological:      Mental Status: He is alert and oriented to person, place, and time.   Psychiatric:         Behavior: Behavior is cooperative.         Lab Results   Component Value Date    GLUCOSE 190 (H) 02/01/2019    CALCIUM 8.5 (L) 02/01/2019     02/01/2019    K 4.3 02/01/2019    CO2 30.0 02/01/2019    CL 98 (L) 02/01/2019    BUN 14 02/01/2019    CREATININE 0.79 02/01/2019    EGFRIFNONA 96 02/01/2019    BCR 17.7 02/01/2019    ANIONGAP 6.0 02/01/2019     Lab Results   Component Value Date    WBC 10.64 01/23/2019    HGB 9.9 (L) 01/23/2019    HCT 31.8 (L) 01/23/2019    MCV 87.4 01/23/2019     01/23/2019 "     Lab Results   Component Value Date    INR 1.61 (H) 01/23/2019    INR 1.26 (H) 01/17/2019    PROTIME 18.4 (H) 01/23/2019    PROTIME 15.2 (H) 01/17/2019     Lab Results   Component Value Date    TSH 2.280 01/22/2019        Results for orders placed during the hospital encounter of 01/22/19    Adult Transthoracic Echo Limited W/ Cont if Necessary Per Protocol    Interpretation Summary  · Estimated EF appears to be in the range of 51 - 55%  · Small Posterior Pericardial effusion seen.  · Right ventricular cavity is mildly dilated.  · Left atrial cavity size is mild-to-moderately dilated.      I personally viewed and interpreted the patient's EKG/Telemetry/lab data.      ECG 12 Lead    Date/Time: 8/5/2022 11:12 AM  Performed by: Shayla Mata APRN  Authorized by: Shayla Mata APRN   Comparison: compared with previous ECG from 12/10/2022  Similar to previous ECG  Rhythm: sinus bradycardia  BPM: 53                  Assessment & Plan    1. Paroxysmal atrial fibrillation (HCC)  -On metoprolol, no known recurrence.  Continue current regimen  -CHA2DS2-VASc=3 (DM, HTN, age), resume Eliquis  -He was instructed to stop taking his Eliquis 1 week before his surgery but this was postponed due to an abnormal stress test.  Surgery has not been rescheduled.  Encouraged him to resume his Eliquis until this is scheduled and follow preop instructions with discontinuation.    2. Pericardial effusion  -Drainage in 2019, cytology was negative.  Repeat echo was ordered in December 2021 but was not performed.  He is following up with a cardiologist at .       Follow Up:  Return in about 1 year (around 8/5/2023).    KEHINDE Ly  08/05/2022

## (undated) DEVICE — TBG INJ CONTRL FLXPOLY WKEEP RA 1200PSI

## (undated) DEVICE — AVANTI + 5F STD W/GW: Brand: AVANTI

## (undated) DEVICE — TUBING, SUCTION, 1/4" X 10', STRAIGHT: Brand: MEDLINE

## (undated) DEVICE — LEX ELECTRO PHYSIOLOGY: Brand: MEDLINE INDUSTRIES, INC.

## (undated) DEVICE — TRY CATH PERICARDIOCENT PERIVAC 8.3F

## (undated) DEVICE — SOL NACL 0.9PCT 1000ML

## (undated) DEVICE — SI AVANTI+ 8F STD W/GW  NO OBT: Brand: AVANTI

## (undated) DEVICE — PK CATH CARD 10

## (undated) DEVICE — CATH EP SUPRM QUADPOLAR JSN 5F 5MM 120CM

## (undated) DEVICE — DECANTER: Brand: UNBRANDED

## (undated) DEVICE — SOL NS 500ML

## (undated) DEVICE — LUER-LOK 360°: Brand: CONNECTA, LUER-LOK

## (undated) DEVICE — LIMB HOLDER, WRIST/ANKLE: Brand: DEROYAL

## (undated) DEVICE — ST EXT IV SMARTSITE 2VLV SP M LL 5ML IV1

## (undated) DEVICE — TEMPERATURE ABLATION CATHETER: Brand: BLAZER® II XP

## (undated) DEVICE — CANN NASL CO2 DIVIDED A/

## (undated) DEVICE — ADULT, W/LG. BACK PAD, RADIOTRANSPARENT ELEMENT AND LEAD WIRE: Brand: DEFIBRILLATION ELECTRODES

## (undated) DEVICE — SI AVANTI+ 7F STD W/GW  NO OBT: Brand: AVANTI

## (undated) DEVICE — ST INF PRI SMRTSTE 20DRP 2VLV 24ML 117

## (undated) DEVICE — DECANT BG O JET

## (undated) DEVICE — DRSNG SURESITE123 4X4.8IN

## (undated) DEVICE — Device: Brand: MEDEX

## (undated) DEVICE — SET PRIMARY GRVTY 10DP MALE LL 104IN

## (undated) DEVICE — KT EVAC WND 3SPRNG 400CC W/DRN RND 3/16IN

## (undated) DEVICE — CATH EP INQUIRY H CRV 7F 1-7-1MM 110CM